# Patient Record
Sex: FEMALE | Race: WHITE | NOT HISPANIC OR LATINO | Employment: FULL TIME | URBAN - METROPOLITAN AREA
[De-identification: names, ages, dates, MRNs, and addresses within clinical notes are randomized per-mention and may not be internally consistent; named-entity substitution may affect disease eponyms.]

---

## 2018-04-04 ENCOUNTER — OFFICE VISIT (OUTPATIENT)
Dept: URGENT CARE | Facility: CLINIC | Age: 43
End: 2018-04-04

## 2018-04-04 VITALS
BODY MASS INDEX: 37.29 KG/M2 | TEMPERATURE: 97.4 F | DIASTOLIC BLOOD PRESSURE: 78 MMHG | SYSTOLIC BLOOD PRESSURE: 116 MMHG | RESPIRATION RATE: 16 BRPM | WEIGHT: 237.6 LBS | HEART RATE: 84 BPM | HEIGHT: 67 IN

## 2018-04-04 DIAGNOSIS — L03.115 CELLULITIS OF RIGHT LOWER EXTREMITY: ICD-10-CM

## 2018-04-04 PROCEDURE — 99204 OFFICE O/P NEW MOD 45 MIN: CPT | Performed by: FAMILY MEDICINE

## 2018-04-04 RX ORDER — SULFAMETHOXAZOLE AND TRIMETHOPRIM 800; 160 MG/1; MG/1
1 TABLET ORAL 2 TIMES DAILY
Qty: 20 TAB | Refills: 0 | Status: SHIPPED | OUTPATIENT
Start: 2018-04-04 | End: 2018-04-14

## 2018-04-04 RX ORDER — SULFAMETHOXAZOLE AND TRIMETHOPRIM 800; 160 MG/1; MG/1
1 TABLET ORAL 2 TIMES DAILY
Qty: 20 TAB | Refills: 0 | Status: SHIPPED | OUTPATIENT
Start: 2018-04-04 | End: 2018-04-04 | Stop reason: SDUPTHER

## 2018-04-04 NOTE — LETTER
April 4, 2018         Patient: Kavitha Benson   YOB: 1975   Date of Visit: 4/4/2018           To Whom it May Concern:    Kavitha Benson was seen in my clinic on 4/4/2018. He may return to work on 4/4/2018 with her wound dressed..    If you have any questions or concerns, please don't hesitate to call.        Sincerely,           Sukhwinder Deluca M.D.  Electronically Signed

## 2018-04-09 ASSESSMENT — ENCOUNTER SYMPTOMS
NUMBNESS: 0
CHILLS: 0
NAUSEA: 0
SHORTNESS OF BREATH: 0
EYE PAIN: 0
FEVER: 0
VOMITING: 0
WEAKNESS: 0
MYALGIAS: 0
SORE THROAT: 0
LEG SWELLING: 1
DIZZINESS: 0

## 2018-04-09 NOTE — PROGRESS NOTES
"  Subjective:     Kavitha Benson is a 42 y.o. male who presents for Abscess (right lower leg abscess X 1 week )       Leg Swelling   This is a new problem. The current episode started in the past 7 days. The problem occurs constantly. The problem has been rapidly worsening. Pertinent negatives include no chest pain, chills, fever, myalgias, nausea, numbness, rash, sore throat, vomiting or weakness.   History reviewed. No pertinent past medical history.History reviewed. No pertinent surgical history.  Social History     Social History   • Marital status: Single     Spouse name: N/A   • Number of children: N/A   • Years of education: N/A     Occupational History   • Not on file.     Social History Main Topics   • Smoking status: Never Smoker   • Smokeless tobacco: Never Used   • Alcohol use No   • Drug use: No   • Sexual activity: Not on file     Other Topics Concern   • Not on file     Social History Narrative   • No narrative on file      Family History   Problem Relation Age of Onset   • Stroke Neg Hx    • Heart Disease Neg Hx     Review of Systems   Constitutional: Negative for chills and fever.   HENT: Negative for sore throat.    Eyes: Negative for pain.   Respiratory: Negative for shortness of breath.    Cardiovascular: Negative for chest pain.   Gastrointestinal: Negative for nausea and vomiting.   Genitourinary: Negative for hematuria.   Musculoskeletal: Negative for myalgias.   Skin: Negative for rash.   Neurological: Negative for dizziness, weakness and numbness.   No Known Allergies   Objective:   /78   Pulse 84   Temp 36.3 °C (97.4 °F)   Resp 16   Ht 1.702 m (5' 7\")   Wt 107.8 kg (237 lb 9.6 oz)   BMI 37.21 kg/m²   Physical Exam   Constitutional: He is oriented to person, place, and time. He appears well-developed and well-nourished. No distress.   HENT:   Head: Normocephalic and atraumatic.   Eyes: Conjunctivae and EOM are normal. Pupils are equal, round, and reactive to light.   "   Cardiovascular: Normal rate and regular rhythm.    No murmur heard.  Pulmonary/Chest: Effort normal and breath sounds normal. No respiratory distress.   Abdominal: Soft. He exhibits no distension. There is no tenderness.   Neurological: He is alert and oriented to person, place, and time. He has normal reflexes. No sensory deficit.   Skin: Skin is warm and dry.        Psychiatric: He has a normal mood and affect.         Assessment/Plan:   Assessment    1. Cellulitis of right lower extremity  - sulfamethoxazole-trimethoprim (BACTRIM DS) 800-160 MG tablet; Take 1 Tab by mouth 2 times a day for 10 days.  Dispense: 20 Tab; Refill: 0  Differential diagnosis, natural history, supportive care, and indications for immediate follow-up discussed.

## 2018-09-02 ENCOUNTER — HOSPITAL ENCOUNTER (INPATIENT)
Facility: MEDICAL CENTER | Age: 43
LOS: 6 days | DRG: 504 | End: 2018-09-08
Attending: EMERGENCY MEDICINE | Admitting: INTERNAL MEDICINE

## 2018-09-02 ENCOUNTER — APPOINTMENT (OUTPATIENT)
Dept: RADIOLOGY | Facility: MEDICAL CENTER | Age: 43
DRG: 504 | End: 2018-09-02
Attending: EMERGENCY MEDICINE

## 2018-09-02 PROBLEM — M86.9 OSTEOMYELITIS (HCC): Status: ACTIVE | Noted: 2018-09-02

## 2018-09-02 PROBLEM — R74.8 ABNORMAL SERUM LEVEL OF ALKALINE PHOSPHATASE: Status: ACTIVE | Noted: 2018-09-02

## 2018-09-02 PROBLEM — F19.10 SUBSTANCE ABUSE (HCC): Status: ACTIVE | Noted: 2018-09-02

## 2018-09-02 PROBLEM — E11.9 DM (DIABETES MELLITUS) (HCC): Status: ACTIVE | Noted: 2018-09-02

## 2018-09-02 PROBLEM — E87.1 HYPONATREMIA: Status: ACTIVE | Noted: 2018-09-02

## 2018-09-02 PROBLEM — D72.829 LEUKOCYTOSIS: Status: ACTIVE | Noted: 2018-09-02

## 2018-09-02 LAB
ALBUMIN SERPL BCP-MCNC: 3.9 G/DL (ref 3.2–4.9)
ALBUMIN/GLOB SERPL: 0.8 G/DL
ALP SERPL-CCNC: 113 U/L (ref 30–99)
ALT SERPL-CCNC: 8 U/L (ref 2–50)
AMPHET UR QL SCN: POSITIVE
ANION GAP SERPL CALC-SCNC: 5 MMOL/L (ref 0–11.9)
APPEARANCE UR: CLEAR
AST SERPL-CCNC: 11 U/L (ref 12–45)
BARBITURATES UR QL SCN: NEGATIVE
BASOPHILS # BLD AUTO: 0.3 % (ref 0–1.8)
BASOPHILS # BLD: 0.04 K/UL (ref 0–0.12)
BENZODIAZ UR QL SCN: NEGATIVE
BILIRUB SERPL-MCNC: 0.4 MG/DL (ref 0.1–1.5)
BILIRUB UR QL STRIP.AUTO: NEGATIVE
BUN SERPL-MCNC: 13 MG/DL (ref 8–22)
BZE UR QL SCN: NEGATIVE
CALCIUM SERPL-MCNC: 9.4 MG/DL (ref 8.5–10.5)
CANNABINOIDS UR QL SCN: NEGATIVE
CHLORIDE SERPL-SCNC: 93 MMOL/L (ref 96–112)
CO2 SERPL-SCNC: 32 MMOL/L (ref 20–33)
COLOR UR: YELLOW
CREAT SERPL-MCNC: 0.64 MG/DL (ref 0.5–1.4)
CREAT UR-MCNC: 61.8 MG/DL
CRP SERPL HS-MCNC: 1.85 MG/DL (ref 0–0.75)
EKG IMPRESSION: NORMAL
EOSINOPHIL # BLD AUTO: 0.24 K/UL (ref 0–0.51)
EOSINOPHIL NFR BLD: 1.9 % (ref 0–6.9)
ERYTHROCYTE [DISTWIDTH] IN BLOOD BY AUTOMATED COUNT: 37.9 FL (ref 35.9–50)
ERYTHROCYTE [SEDIMENTATION RATE] IN BLOOD BY WESTERGREN METHOD: 94 MM/HOUR (ref 0–20)
EST. AVERAGE GLUCOSE BLD GHB EST-MCNC: 332 MG/DL
GLOBULIN SER CALC-MCNC: 4.9 G/DL (ref 1.9–3.5)
GLUCOSE BLD-MCNC: 312 MG/DL (ref 65–99)
GLUCOSE SERPL-MCNC: 431 MG/DL (ref 65–99)
GLUCOSE UR STRIP.AUTO-MCNC: >=1000 MG/DL
GRAM STN SPEC: NORMAL
HBA1C MFR BLD: 13.2 % (ref 0–5.6)
HCT VFR BLD AUTO: 41.4 % (ref 37–47)
HGB BLD-MCNC: 13.6 G/DL (ref 12–16)
IMM GRANULOCYTES # BLD AUTO: 0.04 K/UL (ref 0–0.11)
IMM GRANULOCYTES NFR BLD AUTO: 0.3 % (ref 0–0.9)
KETONES UR STRIP.AUTO-MCNC: NEGATIVE MG/DL
LACTATE BLD-SCNC: 0.7 MMOL/L (ref 0.5–2)
LEUKOCYTE ESTERASE UR QL STRIP.AUTO: NEGATIVE
LYMPHOCYTES # BLD AUTO: 2.58 K/UL (ref 1–4.8)
LYMPHOCYTES NFR BLD: 20.4 % (ref 22–41)
MAGNESIUM SERPL-MCNC: 1.8 MG/DL (ref 1.5–2.5)
MCH RBC QN AUTO: 27.9 PG (ref 27–33)
MCHC RBC AUTO-ENTMCNC: 32.9 G/DL (ref 33.6–35)
MCV RBC AUTO: 85 FL (ref 81.4–97.8)
METHADONE UR QL SCN: NEGATIVE
MICRO URNS: ABNORMAL
MICROALBUMIN UR-MCNC: <0.7 MG/DL
MICROALBUMIN/CREAT UR: NORMAL MG/G (ref 0–30)
MONOCYTES # BLD AUTO: 0.74 K/UL (ref 0–0.85)
MONOCYTES NFR BLD AUTO: 5.9 % (ref 0–13.4)
NEUTROPHILS # BLD AUTO: 9 K/UL (ref 2–7.15)
NEUTROPHILS NFR BLD: 71.2 % (ref 44–72)
NITRITE UR QL STRIP.AUTO: NEGATIVE
NRBC # BLD AUTO: 0 K/UL
NRBC BLD-RTO: 0 /100 WBC
OPIATES UR QL SCN: NEGATIVE
OXYCODONE UR QL SCN: NEGATIVE
PCP UR QL SCN: NEGATIVE
PH UR STRIP.AUTO: 6 [PH]
PLATELET # BLD AUTO: 400 K/UL (ref 164–446)
PMV BLD AUTO: 9.6 FL (ref 9–12.9)
POTASSIUM SERPL-SCNC: 3.9 MMOL/L (ref 3.6–5.5)
PROPOXYPH UR QL SCN: NEGATIVE
PROT SERPL-MCNC: 8.8 G/DL (ref 6–8.2)
PROT UR QL STRIP: NEGATIVE MG/DL
RBC # BLD AUTO: 4.87 M/UL (ref 4.2–5.4)
RBC UR QL AUTO: NEGATIVE
SIGNIFICANT IND 70042: NORMAL
SITE SITE: NORMAL
SODIUM SERPL-SCNC: 130 MMOL/L (ref 135–145)
SOURCE SOURCE: NORMAL
SP GR UR STRIP.AUTO: 1.03
T4 FREE SERPL-MCNC: 0.77 NG/DL (ref 0.53–1.43)
TSH SERPL DL<=0.005 MIU/L-ACNC: 5.56 UIU/ML (ref 0.38–5.33)
UROBILINOGEN UR STRIP.AUTO-MCNC: 1 MG/DL
WBC # BLD AUTO: 12.6 K/UL (ref 4.8–10.8)

## 2018-09-02 PROCEDURE — 96367 TX/PROPH/DG ADDL SEQ IV INF: CPT

## 2018-09-02 PROCEDURE — 87077 CULTURE AEROBIC IDENTIFY: CPT

## 2018-09-02 PROCEDURE — 93010 ELECTROCARDIOGRAM REPORT: CPT | Performed by: INTERNAL MEDICINE

## 2018-09-02 PROCEDURE — 80053 COMPREHEN METABOLIC PANEL: CPT

## 2018-09-02 PROCEDURE — 82043 UR ALBUMIN QUANTITATIVE: CPT

## 2018-09-02 PROCEDURE — 700102 HCHG RX REV CODE 250 W/ 637 OVERRIDE(OP): Performed by: STUDENT IN AN ORGANIZED HEALTH CARE EDUCATION/TRAINING PROGRAM

## 2018-09-02 PROCEDURE — 80307 DRUG TEST PRSMV CHEM ANLYZR: CPT

## 2018-09-02 PROCEDURE — 96365 THER/PROPH/DIAG IV INF INIT: CPT

## 2018-09-02 PROCEDURE — 81003 URINALYSIS AUTO W/O SCOPE: CPT

## 2018-09-02 PROCEDURE — 36415 COLL VENOUS BLD VENIPUNCTURE: CPT

## 2018-09-02 PROCEDURE — 99223 1ST HOSP IP/OBS HIGH 75: CPT | Mod: AI | Performed by: INTERNAL MEDICINE

## 2018-09-02 PROCEDURE — 84439 ASSAY OF FREE THYROXINE: CPT

## 2018-09-02 PROCEDURE — 84443 ASSAY THYROID STIM HORMONE: CPT

## 2018-09-02 PROCEDURE — 770006 HCHG ROOM/CARE - MED/SURG/GYN SEMI*

## 2018-09-02 PROCEDURE — 700105 HCHG RX REV CODE 258: Performed by: EMERGENCY MEDICINE

## 2018-09-02 PROCEDURE — 85025 COMPLETE CBC W/AUTO DIFF WBC: CPT

## 2018-09-02 PROCEDURE — 85652 RBC SED RATE AUTOMATED: CPT

## 2018-09-02 PROCEDURE — 86140 C-REACTIVE PROTEIN: CPT

## 2018-09-02 PROCEDURE — 700111 HCHG RX REV CODE 636 W/ 250 OVERRIDE (IP): Performed by: EMERGENCY MEDICINE

## 2018-09-02 PROCEDURE — 83605 ASSAY OF LACTIC ACID: CPT

## 2018-09-02 PROCEDURE — 87186 SC STD MICRODIL/AGAR DIL: CPT

## 2018-09-02 PROCEDURE — 82570 ASSAY OF URINE CREATININE: CPT

## 2018-09-02 PROCEDURE — 87040 BLOOD CULTURE FOR BACTERIA: CPT | Mod: 91

## 2018-09-02 PROCEDURE — 87070 CULTURE OTHR SPECIMN AEROBIC: CPT

## 2018-09-02 PROCEDURE — 93005 ELECTROCARDIOGRAM TRACING: CPT | Performed by: STUDENT IN AN ORGANIZED HEALTH CARE EDUCATION/TRAINING PROGRAM

## 2018-09-02 PROCEDURE — 73630 X-RAY EXAM OF FOOT: CPT | Mod: RT

## 2018-09-02 PROCEDURE — 700105 HCHG RX REV CODE 258: Performed by: INTERNAL MEDICINE

## 2018-09-02 PROCEDURE — 700111 HCHG RX REV CODE 636 W/ 250 OVERRIDE (IP): Performed by: STUDENT IN AN ORGANIZED HEALTH CARE EDUCATION/TRAINING PROGRAM

## 2018-09-02 PROCEDURE — 83036 HEMOGLOBIN GLYCOSYLATED A1C: CPT

## 2018-09-02 PROCEDURE — 83735 ASSAY OF MAGNESIUM: CPT

## 2018-09-02 PROCEDURE — 700111 HCHG RX REV CODE 636 W/ 250 OVERRIDE (IP): Performed by: INTERNAL MEDICINE

## 2018-09-02 PROCEDURE — 700105 HCHG RX REV CODE 258: Performed by: STUDENT IN AN ORGANIZED HEALTH CARE EDUCATION/TRAINING PROGRAM

## 2018-09-02 PROCEDURE — 82962 GLUCOSE BLOOD TEST: CPT

## 2018-09-02 PROCEDURE — 87205 SMEAR GRAM STAIN: CPT

## 2018-09-02 PROCEDURE — 99285 EMERGENCY DEPT VISIT HI MDM: CPT

## 2018-09-02 RX ORDER — MAGNESIUM SULFATE HEPTAHYDRATE 40 MG/ML
2 INJECTION, SOLUTION INTRAVENOUS ONCE
Status: COMPLETED | OUTPATIENT
Start: 2018-09-02 | End: 2018-09-02

## 2018-09-02 RX ORDER — POLYETHYLENE GLYCOL 3350 17 G/17G
1 POWDER, FOR SOLUTION ORAL
Status: DISCONTINUED | OUTPATIENT
Start: 2018-09-02 | End: 2018-09-08 | Stop reason: HOSPADM

## 2018-09-02 RX ORDER — SODIUM CHLORIDE 9 MG/ML
1000 INJECTION, SOLUTION INTRAVENOUS ONCE
Status: COMPLETED | OUTPATIENT
Start: 2018-09-02 | End: 2018-09-02

## 2018-09-02 RX ORDER — ACETAMINOPHEN 325 MG/1
650 TABLET ORAL EVERY 6 HOURS PRN
Status: DISCONTINUED | OUTPATIENT
Start: 2018-09-02 | End: 2018-09-08 | Stop reason: HOSPADM

## 2018-09-02 RX ORDER — SODIUM CHLORIDE 9 MG/ML
INJECTION, SOLUTION INTRAVENOUS CONTINUOUS
Status: DISCONTINUED | OUTPATIENT
Start: 2018-09-02 | End: 2018-09-05

## 2018-09-02 RX ORDER — LABETALOL HYDROCHLORIDE 5 MG/ML
10 INJECTION, SOLUTION INTRAVENOUS EVERY 4 HOURS PRN
Status: DISCONTINUED | OUTPATIENT
Start: 2018-09-02 | End: 2018-09-08 | Stop reason: HOSPADM

## 2018-09-02 RX ORDER — AMOXICILLIN 250 MG
2 CAPSULE ORAL 2 TIMES DAILY
Status: DISCONTINUED | OUTPATIENT
Start: 2018-09-02 | End: 2018-09-08 | Stop reason: HOSPADM

## 2018-09-02 RX ORDER — BISACODYL 10 MG
10 SUPPOSITORY, RECTAL RECTAL
Status: DISCONTINUED | OUTPATIENT
Start: 2018-09-02 | End: 2018-09-08 | Stop reason: HOSPADM

## 2018-09-02 RX ORDER — DEXTROSE MONOHYDRATE 25 G/50ML
25 INJECTION, SOLUTION INTRAVENOUS
Status: DISCONTINUED | OUTPATIENT
Start: 2018-09-02 | End: 2018-09-05

## 2018-09-02 RX ADMIN — SODIUM CHLORIDE: 9 INJECTION, SOLUTION INTRAVENOUS at 12:41

## 2018-09-02 RX ADMIN — AMPICILLIN SODIUM AND SULBACTAM SODIUM 3 G: 2; 1 INJECTION, POWDER, FOR SOLUTION INTRAMUSCULAR; INTRAVENOUS at 13:29

## 2018-09-02 RX ADMIN — VANCOMYCIN HYDROCHLORIDE 1700 MG: 100 INJECTION, POWDER, LYOPHILIZED, FOR SOLUTION INTRAVENOUS at 22:30

## 2018-09-02 RX ADMIN — ENOXAPARIN SODIUM 40 MG: 100 INJECTION SUBCUTANEOUS at 12:40

## 2018-09-02 RX ADMIN — AMPICILLIN SODIUM AND SULBACTAM SODIUM 3 G: 2; 1 INJECTION, POWDER, FOR SOLUTION INTRAMUSCULAR; INTRAVENOUS at 18:06

## 2018-09-02 RX ADMIN — AMPICILLIN AND SULBACTAM 3 G: 2; 1 INJECTION, POWDER, FOR SOLUTION INTRAVENOUS at 09:52

## 2018-09-02 RX ADMIN — INSULIN HUMAN 4 UNITS: 100 INJECTION, SOLUTION PARENTERAL at 16:05

## 2018-09-02 RX ADMIN — MAGNESIUM SULFATE IN WATER 2 G: 40 INJECTION, SOLUTION INTRAVENOUS at 14:18

## 2018-09-02 RX ADMIN — SODIUM CHLORIDE 1000 ML: 9 INJECTION, SOLUTION INTRAVENOUS at 09:30

## 2018-09-02 RX ADMIN — VANCOMYCIN HYDROCHLORIDE 2500 MG: 100 INJECTION, POWDER, LYOPHILIZED, FOR SOLUTION INTRAVENOUS at 10:39

## 2018-09-02 ASSESSMENT — LIFESTYLE VARIABLES
EVER_SMOKED: NEVER
DO YOU DRINK ALCOHOL: NO
SUBSTANCE_ABUSE: 1

## 2018-09-02 ASSESSMENT — ENCOUNTER SYMPTOMS
CLAUDICATION: 0
HEARTBURN: 0
NERVOUS/ANXIOUS: 1
MYALGIAS: 0
BLURRED VISION: 0
TINGLING: 0
WHEEZING: 0
SORE THROAT: 0
HEADACHES: 0
EYE PAIN: 0
PHOTOPHOBIA: 0
CHILLS: 0
VOMITING: 0
SPUTUM PRODUCTION: 0
EYE REDNESS: 0
DOUBLE VISION: 0
BRUISES/BLEEDS EASILY: 0
DIAPHORESIS: 0
ORTHOPNEA: 0
BACK PAIN: 0
CONSTIPATION: 0
SENSORY CHANGE: 0
HEMOPTYSIS: 0
EYE DISCHARGE: 0
COUGH: 0
POLYDIPSIA: 0
WEIGHT LOSS: 0
SINUS PAIN: 0
WEAKNESS: 0
DIZZINESS: 0
BLOOD IN STOOL: 0
NAUSEA: 0
DIARRHEA: 0
ABDOMINAL PAIN: 0
SHORTNESS OF BREATH: 0
PALPITATIONS: 0
FLANK PAIN: 0
FEVER: 0
NECK PAIN: 0

## 2018-09-02 ASSESSMENT — PAIN SCALES - GENERAL
PAINLEVEL_OUTOF10: 0
PAINLEVEL_OUTOF10: 0
PAINLEVEL_OUTOF10: 1
PAINLEVEL_OUTOF10: 0

## 2018-09-02 ASSESSMENT — COPD QUESTIONNAIRES
HAVE YOU SMOKED AT LEAST 100 CIGARETTES IN YOUR ENTIRE LIFE: NO/DON'T KNOW
DURING THE PAST 4 WEEKS HOW MUCH DID YOU FEEL SHORT OF BREATH: NONE/LITTLE OF THE TIME
COPD SCREENING SCORE: 0
IN THE PAST 12 MONTHS DO YOU DO LESS THAN YOU USED TO BECAUSE OF YOUR BREATHING PROBLEMS: DISAGREE/UNSURE
DO YOU EVER COUGH UP ANY MUCUS OR PHLEGM?: NO/ONLY WITH OCCASIONAL COLDS OR INFECTIONS

## 2018-09-02 ASSESSMENT — COGNITIVE AND FUNCTIONAL STATUS - GENERAL
SUGGESTED CMS G CODE MODIFIER MOBILITY: CI
DAILY ACTIVITIY SCORE: 24
MOBILITY SCORE: 23
SUGGESTED CMS G CODE MODIFIER DAILY ACTIVITY: CH
CLIMB 3 TO 5 STEPS WITH RAILING: A LITTLE

## 2018-09-02 ASSESSMENT — PATIENT HEALTH QUESTIONNAIRE - PHQ9
2. FEELING DOWN, DEPRESSED, IRRITABLE, OR HOPELESS: NOT AT ALL
SUM OF ALL RESPONSES TO PHQ9 QUESTIONS 1 AND 2: 0
1. LITTLE INTEREST OR PLEASURE IN DOING THINGS: NOT AT ALL

## 2018-09-02 NOTE — ED PROVIDER NOTES
"ED Provider Note  CHIEF COMPLAINT  Chief Complaint   Patient presents with   • Wound Infection     right 2nd toe        HPI  Kavitha Benson is a 43 y.o. male who presents for evaluation of significant swelling redness to her right 2nd toe. The patient has no stated medical or surgical history. She reports that she had similar swelling to another told him infected and that simply better with oral antibiotics but she reports that this is much worse. She did attempt to cut her toenails about a week or 2 ago. Swelling got to the point that she cannot wear shoe. She denies history of diabetes. No direct blunt draining trauma. No other complaints    REVIEW OF SYSTEMS  See HPI for further details. No reported fevers  cyanosis apnea All other systems are negative.     PAST MEDICAL HISTORY  No past medical history on file.  No stated medical history  FAMILY HISTORY  Noncontributory    SOCIAL HISTORY  Social History     Social History   • Marital status: Single     Spouse name: N/A   • Number of children: N/A   • Years of education: N/A     Social History Main Topics   • Smoking status: Never Smoker   • Smokeless tobacco: Never Used   • Alcohol use No   • Drug use: Yes      Comment: \"smokes speed\"   • Sexual activity: Not on file     Other Topics Concern   • Not on file     Social History Narrative   • No narrative on file   No IV drugs    SURGICAL HISTORY  No past surgical history on file.  No major surgeries  CURRENT MEDICATIONS  Home Medications     Reviewed by Kasey Mosley R.N. (Registered Nurse) on 09/02/18 at 0742  Med List Status: Not Addressed   Medication Last Dose Status        Patient Pritesh Taking any Medications                       ALLERGIES  No Known Allergies    PHYSICAL EXAM  VITAL SIGNS: /80   Pulse 94   Temp 35.9 °C (96.7 °F)   Resp 16   Ht 1.702 m (5' 7\")   Wt 101.9 kg (224 lb 10.4 oz)   SpO2 98%   BMI 35.18 kg/m²  Room air O2: 98    Constitutional: Well developed, Well nourished, " No acute distress, Non-toxic appearance.   HENT: Normocephalic, Atraumatic, Bilateral external ears normal, Oropharynx moist, No oral exudates, Nose normal.   Eyes: PERRLA, EOMI, Conjunctiva normal, No discharge.   Neck: Normal range of motion, No tenderness, Supple, No stridor.   Lymphatic: No lymphadenopathy noted.   Cardiovascular: Normal heart rate, Normal rhythm, No murmurs, No rubs, No gallops.   Thorax & Lungs: Normal breath sounds, No respiratory distress, No wheezing, No chest tenderness.   Abdomen: Bowel sounds normal, Soft, No tenderness, No masses, No pulsatile masses.   Skin: Warm, Dry, No erythema, No rash.   Back: No tenderness, No CVA tenderness.   Extremities right foot exam is notable for extensive varicosities. The right 2nd toe is profoundly edematous erythematous swollen no necrosis. There is about 4 times the normal size   Neurologic: Alert & oriented x 3, Normal motor function, Normal sensory function, No focal deficits noted.   Psychiatric anxious    Results for orders placed or performed during the hospital encounter of 09/02/18   CBC WITH DIFFERENTIAL   Result Value Ref Range    WBC 12.6 (H) 4.8 - 10.8 K/uL    RBC 4.87 4.20 - 5.40 M/uL    Hemoglobin 13.6 12.0 - 16.0 g/dL    Hematocrit 41.4 37.0 - 47.0 %    MCV 85.0 81.4 - 97.8 fL    MCH 27.9 27.0 - 33.0 pg    MCHC 32.9 (L) 33.6 - 35.0 g/dL    RDW 37.9 35.9 - 50.0 fL    Platelet Count 400 164 - 446 K/uL    MPV 9.6 9.0 - 12.9 fL    Neutrophils-Polys 71.20 44.00 - 72.00 %    Lymphocytes 20.40 (L) 22.00 - 41.00 %    Monocytes 5.90 0.00 - 13.40 %    Eosinophils 1.90 0.00 - 6.90 %    Basophils 0.30 0.00 - 1.80 %    Immature Granulocytes 0.30 0.00 - 0.90 %    Nucleated RBC 0.00 /100 WBC    Neutrophils (Absolute) 9.00 (H) 2.00 - 7.15 K/uL    Lymphs (Absolute) 2.58 1.00 - 4.80 K/uL    Monos (Absolute) 0.74 0.00 - 0.85 K/uL    Eos (Absolute) 0.24 0.00 - 0.51 K/uL    Baso (Absolute) 0.04 0.00 - 0.12 K/uL    Immature Granulocytes (abs) 0.04 0.00 - 0.11  K/uL    NRBC (Absolute) 0.00 K/uL   COMP METABOLIC PANEL   Result Value Ref Range    Sodium 130 (L) 135 - 145 mmol/L    Potassium 3.9 3.6 - 5.5 mmol/L    Chloride 93 (L) 96 - 112 mmol/L    Co2 32 20 - 33 mmol/L    Anion Gap 5.0 0.0 - 11.9    Glucose 431 (H) 65 - 99 mg/dL    Bun 13 8 - 22 mg/dL    Creatinine 0.64 0.50 - 1.40 mg/dL    Calcium 9.4 8.5 - 10.5 mg/dL    AST(SGOT) 11 (L) 12 - 45 U/L    ALT(SGPT) 8 2 - 50 U/L    Alkaline Phosphatase 113 (H) 30 - 99 U/L    Total Bilirubin 0.4 0.1 - 1.5 mg/dL    Albumin 3.9 3.2 - 4.9 g/dL    Total Protein 8.8 (H) 6.0 - 8.2 g/dL    Globulin 4.9 (H) 1.9 - 3.5 g/dL    A-G Ratio 0.8 g/dL   CRP QUANTITIVE (NON-CARDIAC)   Result Value Ref Range    Stat C-Reactive Protein 1.85 (H) 0.00 - 0.75 mg/dL   ESTIMATED GFR   Result Value Ref Range    GFR If African American >60 >60 mL/min/1.73 m 2    GFR If Non African American >60 >60 mL/min/1.73 m 2        RADIOLOGY/PROCEDURES  DX-FOOT-COMPLETE 3+ RIGHT   Final Result      Extensive soft tissue swelling with extensive bone destruction involving the second middle and distal phalanges suggesting osteomyelitis with a destructive neoplastic process seeming less likely.      Similar findings are also noted to a lesser degree involving the third and fourth phalangeal marilin.            COURSE & MEDICAL DECISION MAKING  Pertinent Labs & Imaging studies reviewed. (See chart for details)  Blood cultures have been performed. The patient has suggestion here of significant diabetic toe infection, cellulitis and likely osteomyelitis. She is a newly diagnosed diabetic and with her extensive infection I do believe that she will require admission to the hospital. She was given vancomycin and Unasyn and will be admitted to [facility]. The patient was clinically dehydrated due to hyperglycemia and was given an IV fluid bolus after IV fluid administration she was feeling better    FINAL IMPRESSION  1. Right 2nd toe cellulitis with  osteomyelitis  2. New diagnosis of diabetes mellitus    Admission         Electronically signed by: Cordell Colindres, 9/2/2018 8:06 AM

## 2018-09-02 NOTE — NON-PROVIDER
Internal Medicine Medical Student Admitting History and Physical  Note Author: Jacob Roque, Student    Name Kavitha Benson     1975   Age/Sex 43 y.o. female   MRN [id number]   Code Status FULL       Chief Complaint:  Severely swollen second digit of right foot      HPI:    The patient is a 43 year old female with no significant known past medical history presenting to the emergency department for a severely inflamed and most likely infected second digit of her right foot. The patient reports that her toe has been inflamed for 3 to 4 weeks but it has not swollen up this much before. She also reports that she had a similar episode of toe swelling back in April of this year but thinks this time may have been caused by trauma to the skin during toenail clipping. She says that she has had to change the type of shoes that she wears to accommodate how large her toe has become. She denies any pain in her toe and she also denies fatigue, fever, chills, night sweats, nausea, vomiting, or myalgias.     She is visibly upset/agitated and states that she is upset because she did not plan to be here and now she cannot get her finances like rent and payday loans together while she is in the hospital. When she was asked about her blood sugar and the idea that she is likely diabetic, she denies any past knowledge of her having the condition. She admits that she has not seen a primary care provider in a very long time.        Review of Systems   Constitutional: Negative for chills, diaphoresis, fever, malaise/fatigue and weight loss.   HENT: Negative for ear pain, hearing loss and sore throat.    Eyes: Negative for blurred vision and pain.   Respiratory: Negative for cough and shortness of breath.    Cardiovascular: Positive for leg swelling. Negative for chest pain.   Gastrointestinal: Negative for abdominal pain, constipation, diarrhea, nausea and vomiting.   Genitourinary: Negative for dysuria, flank pain,  "frequency and urgency.   Musculoskeletal: Negative for myalgias.   Skin: Positive for itching. Negative for rash.   Neurological: Negative for tingling, weakness and headaches.   Psychiatric/Behavioral: Positive for substance abuse. The patient is nervous/anxious.              Past Medical History (chronic problems, known complications, current management)     The patient denies knowledge of any past medical history.    Past Surgical History:  The patient denies any past history of surgeries.      Medication Allergy/Sensitivities:  The patient denies any medication allergies.      Family History:  Patient reports that her father and sister have diabetes mellitus.    Social History:  The patient denies smoking tobacco. She reports occasional alcohol use but when asked about amounts or frequency, she responds by saying \"I don't know.\" She reports smoking marijuana and smoking/snorting/injecting methamphetamine but when asked about amounts or frequency, she responds by saying \"I don't know.\" She reports that her last use of methamphetamine was on the day of her hospital admission (9/2/18). She denies being sexually active. She reports living alone at home with her dog, she reports working at a [employer].      Physical Exam     Vitals:    09/02/18 0656 09/02/18 0702 09/02/18 1031 09/02/18 1200   BP: 122/80  127/82 118/68   Pulse: 94  92 72   Resp: 16  16 16   Temp: 35.9 °C (96.7 °F)   36.6 °C (97.9 °F)   SpO2: 98%  98% 98%   Weight:  101.9 kg (224 lb 10.4 oz)     Height: 1.702 m (5' 7\")        Body mass index is 35.18 kg/m².  /68   Pulse 72   Temp 36.6 °C (97.9 °F)   Resp 16   Ht 1.702 m (5' 7\")   Wt 101.9 kg (224 lb 10.4 oz)   SpO2 98%   Breastfeeding? No   BMI 35.18 kg/m²   O2 therapy: Pulse Oximetry: 98 %, O2 Delivery: None (Room Air)    Physical Exam   Constitutional: She is oriented to person, place, and time. She appears distressed.   Patient is well developed and nourished, but she is visibly upset " and anxious about her responsibilities outside of the hospital.   HENT:   Head: Normocephalic and atraumatic.   Mouth/Throat: Oropharynx is clear and moist. No oropharyngeal exudate.   The patient has poor dentition.   Eyes: Pupils are equal, round, and reactive to light. Conjunctivae and EOM are normal. Right eye exhibits no discharge. Left eye exhibits no discharge. No scleral icterus.   Neck: Normal range of motion. Neck supple.   Cardiovascular: Normal rate, regular rhythm, S1 normal, S2 normal and intact distal pulses.  Exam reveals gallop and S3. Exam reveals no distant heart sounds and no friction rub.    Pulmonary/Chest: Effort normal and breath sounds normal. No respiratory distress. She has no wheezes. She has no rales. She exhibits no tenderness.   Abdominal: Soft. Bowel sounds are normal. She exhibits no distension. There is no tenderness. There is no rebound and no guarding.   Musculoskeletal: Normal range of motion. She exhibits edema.   The patient exhibits 1+ edema in her right lower leg and has decreased range of motion of her second digit of her right foot. This digit is also severely inflamed and occasionally leaks purulent material.   Neurological: She is alert and oriented to person, place, and time. No cranial nerve deficit.   Skin: Skin is warm and dry. No rash noted. There is erythema. No pallor.   The patient has multiple excoriated skin lesions on her back, arms, and legs secondary to chronic and consistent scratching. Her second digit on her right foot is erythematous and edematous.   Psychiatric: Memory and judgment normal. Her mood appears anxious. She is agitated.     Lab Results   Component Value Date/Time    WBC 12.6 (H) 09/02/2018 08:21 AM    RBC 4.87 09/02/2018 08:21 AM    HEMOGLOBIN 13.6 09/02/2018 08:21 AM    HEMATOCRIT 41.4 09/02/2018 08:21 AM    MCV 85.0 09/02/2018 08:21 AM    MCH 27.9 09/02/2018 08:21 AM    MCHC 32.9 (L) 09/02/2018 08:21 AM    MPV 9.6 09/02/2018 08:21 AM     NEUTSPOLYS 71.20 09/02/2018 08:21 AM    LYMPHOCYTES 20.40 (L) 09/02/2018 08:21 AM    MONOCYTES 5.90 09/02/2018 08:21 AM    EOSINOPHILS 1.90 09/02/2018 08:21 AM    BASOPHILS 0.30 09/02/2018 08:21 AM     Lab Results   Component Value Date/Time    SODIUM 130 (L) 09/02/2018 08:21 AM    POTASSIUM 3.9 09/02/2018 08:21 AM    CHLORIDE 93 (L) 09/02/2018 08:21 AM    CO2 32 09/02/2018 08:21 AM    GLUCOSE 431 (H) 09/02/2018 08:21 AM    BUN 13 09/02/2018 08:21 AM    CREATININE 0.64 09/02/2018 08:21 AM                                 Results for orders placed during the hospital encounter of 09/02/18   DX-FOOT-COMPLETE 3+ RIGHT    Impression Extensive soft tissue swelling with extensive bone destruction involving the second middle and distal phalanges suggesting osteomyelitis with a destructive neoplastic process seeming less likely.    Similar findings are also noted to a lesser degree involving the third and fourth phalangeal marilin.                                                                      Assessment/Plan     The patient is a 43 year old female with no significant known past medical history presenting to the emergency department for a severely inflamed and most likely infected second digit of her right foot.     #Foot Wound (Osteomyelitis)  The patient's erythematous and edematous second digit of her right toe is likely a result of a bacterial infection causing inflammation and purulent fluid collection. This is evidenced by her elevated white blood cell count, c-reactive protein, and sed rate this morning. She does not meet the criteria for sepsis. The patient has not reported any pain in her infected toe , but she has been prescribed Tylenol 650 mg PO PRN for her pain. The patient had an x-ray performed today which shows results consistent with osteomyelitis. She also had an elevated alkaline phosphatase level this morning which is likely due to infection of her bone (osteomyelysis) and not her biliary tree. The  patient has been informed that she may have to have her infected digit amputated to prevent severe, disseminated infection. However, for now she will be placed on IV Vancomycin and Unasyn (3 g in  ml) to provide empiric treatment for her infection. Wound and blood cultures were drawn today to determine the bacterial agent(s) causing her infection. Because of the presence of cardiac abnormalities while auscultating the chest, the patient should receive an echocardiogram if the blood cultures display bacteremia.     #Diabetes  The patient has displayed new onset diabetes. She has a hemoglobin A1c of 13.2 and her blood sugar was 431 on admission. She denies signs of diabetic neuropathy, but she will have her urine microalbumin to creatinine ratio measured to determine whether or not she may be experiencing diabetic nephropathy. She will also have a urinalysis performed as another diagnostic tool to rule in/out diabetic nephropathy. She needs to be seen by diabetes education and should see a nutritionist about beginning a diabetic diet. Lastly, she will be started on a sliding scale insulin to help with gylcemic control (insulin regular injection 1-6 units 4 times per day). Basal insulin can be started tomorrow and dose adjusted based on the patient's reaction to the sliding scale insulin. In addition, since the patient has a blood pressure less than 130/80 and would be considered high risk due to her lab values and clinical presentation, she is a good candidate for ace inhibitor therapy for renal protection.    #Hyponatremia  The patient had a sodium concentration of 130 mmol/L this morning, but when corrected for hyperglycemia,her sodium concentration was actually 135 mmol/L (normal range). If sodium levels stay within normal limits, then the patient will not have a need for a continuous normal saline infusion.    #Substance abuse  The patient reports methamphetamine use utilizing several different routes of  administration including intravenous. The patient should receive a urine drug screen, an HIV screen, and a hepatitis screen. The patient should be counseled on her drug use because use of methamphetamine could put the patient at high risk for cardiovascular abnormalities, especially with her new onset diabetes.

## 2018-09-02 NOTE — SENIOR ADMIT NOTE
" Senior Admission Note    In summary: Kavitha Benson is a 43 y.o. female with no significant past medical history presented to hospital with complaints of worsening swelling of 2nd toe on right foot. She is a bit vague regarding onset however she states it has been going on for weeks and she has been \"messing with it\". She denies associated history of fever, chills, malaise. Denies trauma to her foot however does acknowledge she was trying to clip her toe nails. She states she finally came in for medical attention since the swelling was not going down. She has been wearing two different shoes due to the swelling. Denies associated pain, numbness or tingling in her foot.     H/o similar infection (abscess in April 2018). She was incidentally found to have blood sugars of 431. This is a new diagnosis for her. Patient states she hasn't seen a doctor in years for annual check ups.      H/o Drug use: used meth this morning. Denies smoking cigarettes and alcohol    Review of Systems:  Constitutional: Denies fevers, chills, malaise  Eyes: Denies acute changes in vision.   Ears/Nose/Throat/Mouth: Denies nasal congestion or sore throat. Reports she has trouble swallowing pills but denies dysphagia to solids/liquids or odynophagia.   Cardiovascular: Denies chest pain or palpitations   Respiratory: Denies shortness of breath , Denies cough  Gastrointestinal/Hepatic: Denies abdominal pain, nausea, vomiting, or diarrhea   Genitourinary: Denies polyuria, dysuria or frequency  Skin/Breast: Denies rash; reports multiple scabs on her lower extremities and back from scratching.    Neurological: Denies headache. Denied focal weakness/parasthesias    Family history of DM in father and her paternal aunt.     In the ED, patient received 1 L IV bolus. Blood cultures and wound cultures obtained and she was empirically started on Unasyn and Vancomycin.         Pertinent physical exam findings:    Vitals:    09/02/18 0656 09/02/18 0702 " "09/02/18 1031 09/02/18 1200   BP: 122/80  127/82 118/68   Pulse: 94  92 72   Resp: 16  16 16   Temp: 35.9 °C (96.7 °F)   36.6 °C (97.9 °F)   SpO2: 98%  98% 98%   Weight:  101.9 kg (224 lb 10.4 oz)     Height: 1.702 m (5' 7\")        General: Not in acute distress. Does not appear ill  HEENT: Normocephalic, atraumatic. Conjunctiva normal. Poor dentition.   Cardiovascular: Normal heart rate, Normal rhythm. Systolic murmur in right sternal border   Lungs: Respiratory effort is normal. Normal breath sounds  Abdomen: Bowel sounds normal, Soft, No tenderness  Skin: No erythema, No rash  Lower limbs: normal, no pitting edema   Neurologic: Alert & oriented x 3, Normal motor function, Normal sensory function, No focal deficits noted, cranial nerves II through XII are normal  PSY: stable mood.   Other systems also examined, grossly normal.     Pertinent studies:   Leukocytosis: WBC 12.6  Hb 13.6  Platelet count 400  ESR: 94 (elevated), CRP (1.85)  Glucose 431  Sodium: 130; corrected was 135  AST 11, ALT 8. T. Bili 0.4 ; Alk phos 113 (elevated; likely from increased bone turnover from osteomyelitis in her toe)  Magnesium 1.8 (will replace with 2 grams)  HbA1c: 13.2  TSH 5.560 (mildly elevated); Free T4 pending however she is asymptomatic.   X-ray foot: diffuse soft tissue swelling involving 2nd digit with extensive bone destruction in middle and distal phalanx likely suggestive of osteomyelitis.     No anion gap  Lactic acid ordered  Blood cultures in process  Wound culture in process  U/A and urine tox in process    Assessment and plan in summary:  1. Osteomyelitis of 2nd toe in right foot  - Confirmed by x-ray findings, elevated inflammatory markers. She has associated leukocytosis however vitals are stable; no hypotension or fevers. No features of sepsis at this time. She did receive 1 L bolus in the ED however sepsis protocol bolus not indicated at this time as no evidence of sepsis. Renal function is preserved. Osteo is " complicated by new onset DM. She does have a murmur on exam; in the setting of IV drug will monitor for possible IE however unlikely at this point as she is clinically stable with no other physical exam evidence of IE.   - Continue empiric antibiotics with Unasyn and Vancomycin. Will de-escalate once cultures return  - Follow-up with cultures (wound and bacterial)  - Continue IV fluids; if she has good oral intake will d/c  - Limb preservation consult placed; will be assessing patient   - Consider echo depending on blood cultures, developments fevers despite optimal antibiotic treatment.   - Tylenol for pain however no significant pain currently      2. Hyponatremia  - 130 however when corrected for hyperglycemia sodium is 135, which is within normal limits  - Will continue some maintenance fluids; if she has good oral intake will discontinue.       3. Diabetes Mellitus  - New diagnosis for patient; she has not been following with physicians regularly.   - HbA1c 13.2.   - Diabetic diet  - Will have diabetic education see patient  - Will start sliding scale for now (insulin naive); will see how she does and add basal insulin in AM  - Will get lipid panel in AM      4. Drug Use  - History of IV drug use. Used as recent as this morning. She apparently shot up about 1 week ago  - Will monitor for possible IE in the setting of infection and systolic heart murmur however unlikely at this point as she does not appear Ill  - Utox pending  - monitor for evidence of meth withdrawal (agitation, hypertension)          For full plan, please see Intern note for details   Lilibeth Cannon M.D.  PGY 2

## 2018-09-02 NOTE — PROGRESS NOTES
Pt has arrived to the floor from the ER. VVS, IV in LAC running Vancomycin. PT is A&Ox4. Pt was settled to her room. Pt has open wounds on her R. 2nd toe that is swollen and red. Wound protocol followed.  2 RN skin check with Pey[place]. She has bilateral scabs on her lower extremities, scabs on her back, an scratch on her left buttock.

## 2018-09-02 NOTE — CARE PLAN
Problem: Infection  Goal: Will remain free from infection  Outcome: PROGRESSING AS EXPECTED  Pt was informed of using IS, brushing teeth, and wound consult, and pt is receiving vancomycin.     Problem: Knowledge Deficit  Goal: Knowledge of disease process/condition, treatment plan, diagnostic tests, and medications will improve  Outcome: PROGRESSING AS EXPECTED  Plan of care discussed with pt, in regards to procedures such as EKG, consults with different specialities such as wound, diabetes, and limb preservation.

## 2018-09-02 NOTE — WOUND TEAM
Pt was seen and is being followed by LPS. Please consult for other wounds if necessary. Wound team not following @ this time.

## 2018-09-02 NOTE — PROGRESS NOTES
LIMB PRESERVATION SERVICE INITIAL CONSULT    REASON FOR LPS CONSULTATION: Right Foot - 2nd Digit Edema\Erythema    History of Present Illness:     Kavitha Benson is a 43 y.o. female admitted 9/2/2018, with a past medical history that includes uncontrolled type 2 diabetes  admitted for Diabetes ([facility])  Cellulitis and abscess of toe of right foot for which LPS has been consulted for. This wound is chronic per the pt but has gotten worse in the past three to four weeks. The patient is a poor historian though and is vague on how long the initial onset of the toe infection was. The pt states the have had multiple toe infections of the right 2nd and 3rd digits for which they have received oral antibiotics for in the past and the wounds resolved. The pt also states the broke their right 4th toe but never received medical attention. They treat their wounds with epsom salts baths but the wounds have failed to improve.   IV antibiotics were started on this admission.  Infectious diseases has been consulted. Dr Loya will be contacted    Xray has been done  MRI has not been done  Ortho will be seen Tuesday - Dr Stevens  Pt states they did not know they were diabetec.  Pt does not check blood sugars.  They have not had previous diabetes education.  They do have numbness in their feet.  They have no diabetic footwear. They do not check their feet routinely. They work as a .     Smoking:   reports that she has never smoked. She has never used smokeless tobacco.    Alcohol:   reports that she does not drink alcohol.    PAST MEDICAL HISTORY:   History reviewed. No pertinent past medical history.    MEDICATIONS:   Current Facility-Administered Medications   Medication Dose   • senna-docusate (PERICOLACE or SENOKOT S) 8.6-50 MG per tablet 2 Tab  2 Tab    And   • polyethylene glycol/lytes (MIRALAX) PACKET 1 Packet  1 Packet    And   • magnesium hydroxide (MILK OF MAGNESIA) suspension 30 mL  30 mL    And   •  "bisacodyl (DULCOLAX) suppository 10 mg  10 mg   • NS infusion     • enoxaparin (LOVENOX) inj 40 mg  40 mg   • labetalol (NORMODYNE,TRANDATE) injection 10 mg  10 mg   • acetaminophen (TYLENOL) tablet 650 mg  650 mg   • insulin regular (HUMULIN R) injection 1-6 Units  1-6 Units    And   • glucose 4 g chewable tablet 16 g  16 g    And   • dextrose 50% (D50W) injection 25 mL  25 mL   • ampicillin/sulbactam (UNASYN) 3 g in  mL IVPB  3 g   • MD Alert...Vancomycin per Pharmacy     • magnesium sulfate IVPB premix 2 g  2 g       ALLERGIES:  No Known Allergies     PAST SURGICAL HISTORY: History reviewed. No pertinent surgical history.    SOCIAL HISTORY:   Social History     Social History   • Marital status: Single     Spouse name: N/A   • Number of children: N/A   • Years of education: N/A     Social History Main Topics   • Smoking status: Never Smoker   • Smokeless tobacco: Never Used   • Alcohol use No   • Drug use: Yes      Comment: \"smokes speed\"   • Sexual activity: Not on file     Other Topics Concern   • Not on file     Social History Narrative   • No narrative on file        FAMILY HISTORY:   Family History   Problem Relation Age of Onset   • Stroke Neg Hx    • Heart Disease Neg Hx        REVIEW OF SYSTEMS:   Constitutional: Negative for chills, fever   Respiratory: Negative for cough and shortness of breath.    Cardiovascular:Negative for chest pain, swelling in legs, and claudication.   Gastrointestinal: Negative for constipation, diarrhea, nausea and vomiting.   Genitourinary: Negative for dysuria.   Neurological: numbness in feet and lower legs    DIAGNOSTIC DATA:   A1C:  Lab Results   Component Value Date/Time    HBA1C 13.2 (H) 09/02/2018 08:21 AM        LAST Blood glucose:   431    WBC   Date/Time Value Ref Range Status   09/02/2018 08:21 AM 12.6 (H) 4.8 - 10.8 K/uL Final     Recent Labs      09/02/18   0821   WBC  12.6*   RBC  4.87   HEMOGLOBIN  13.6   HEMATOCRIT  41.4   MCV  85.0   MCH  27.9   MCHC  " "32.9*   RDW  37.9   PLATELETCT  400   MPV  9.6     Recent Labs      09/02/18 0821   SODIUM  130*   POTASSIUM  3.9   CHLORIDE  93*   CO2  32   GLUCOSE  431*   BUN  13       ESR:   94    CRP:      1.85    X-ray:  9/22/18    DX-FOOT-COMPLETE 3+ RIGHT   Final Result      Extensive soft tissue swelling with extensive bone destruction involving the second middle and distal phalanges suggesting osteomyelitis with a destructive neoplastic process seeming less likely.      Similar findings are also noted to a lesser degree involving the third and fourth phalangeal marilin.        MRI: NA    Arterial studies:    NA  R:  L:     RLE: Triphasic/triphasic   LLE: Triphasic/triphasic     Microbiology:  Results     Procedure Component Value Units Date/Time    Urinalysis [[id number]]     Order Status:  Sent Specimen:  Urine from Urine, Clean Catch     CULTURE WOUND W/ GRAM STAIN [[id number]] Collected:  09/02/18 0928    Order Status:  Completed Specimen:  Wound from Right Foot Updated:  09/02/18 0932    BLOOD CULTURE x2 [[id number]] Collected:  09/02/18 0821    Order Status:  Completed Specimen:  Blood from Peripheral Updated:  09/02/18 0850    Narrative:       Per Hospital Policy: Only change Specimen Src: to \"Line\" if  specified by physician order.    BLOOD CULTURE x2 [[id number]] Collected:  09/02/18 0842    Order Status:  Completed Specimen:  Blood from Peripheral Updated:  09/02/18 0845    Narrative:       Per Hospital Policy: Only change Specimen Src: to \"Line\" if  specified by physician order.           PHYSICAL EXAMINATION:     Vitals  /68   Pulse 72   Temp 36.6 °C (97.9 °F)   Resp 16   Ht 1.702 m (5' 7\")   Wt 101.9 kg (224 lb 10.4 oz)   SpO2 98%   Breastfeeding? No   BMI 35.18 kg/m²      Pain:        Patient resting comfortably    General Appearance:  Well developed, well nourished, in no acute distress    Lower Extremity Assessment:  Edema +1 RLE    Pulses: RLE- DP pulse +2 palpable; PT pulse +2 palpable           "      LLE- DP pulse +2 palpable; PT pulse +2 palpable     ROM dorsi/plantar  Structural /mechanical      Sensory Assessment  Feet Insensate to light touch    Wound Assessment:    Wound Characteristics                                                                            Location: Right Foot - 2nd Digit Initial Evaluation  Date:9/2/2018   Tissue Type and %: 100% Red   Periwound: Edematous / Erythemic   Drainage: Minimal Purulant   Exposed structures None   Wound Edges:   Attached   Odor: None   S&S of Infection:   Edema/Erythema   Edema: Localized at Site   Sensation: Insensate               Measurements: Initial Evaluation  Date:9/2/2018   Length (cm) 0.2   Width (cm) 1   Depth (cm)     Tract/undermine                    Procedures:       Debridement :  NA   Cleansed with:     NS                                                                     Periwound protected with: skin prep   Primary dressing: AQAG   Secondary Dressing: foam   Other: tape      NURSING TO CHANGE RIGHT TOE DRESSING EVERY 48 HOURS AND PRN FOR SATURATION OR DISLODGEMENT  Nursing to cleanse wound/periwound with Normal Saline (NS).  Pat periwound dry and apply skin prep/No Sting to periwound.  Let air dry for 1-2 minutes.  Apply a piece of AqAg (Hydrofiber Silver), cut to size, to the wound bed.  Cover with non adhesive foam, cut to size, and secure with hypafix tape.  Please take Weekly Wound Photos. Notify wound team if wound deteriorates or fails to progress.      Plan:      1. Labs\Imaging: Reviewed    2. Treatment:   Pt NPO @ midnight tomorrow for right 2nd toe amp with Dr Stevens Tuesday     Wound Care by Nursing, LPS to Follow.                           Dressing Orders Updated. Skin Care Updated.      Nursing to change every 48 hours and PRN for Saturation or Dislodgement     Antibiotics WAYNE CAMARENA when OOB wearing Shoe    3.Collaboration:      Diabetes Educator Involved: A1C is 13.2% Pt educated on keeping BS less than 150 to  control infection and promote wound healing     Ortho Techs involved: shoe ordered     IV Antibiotics per ID    4. Orthotics/Prosthetics:      pt to get orthotics/prosthetics  as OP, pt to wear shoes that do not rub on Wound sites       Anticipated discharge plans (X):   SNF:            Home Care:            Outpatient Wound Center:     X   Self Care:             Other:            TBD: X    Professional Collaboration: D/W:  [employer] Yellow Team, Bedside RN, Dr Stevens

## 2018-09-02 NOTE — PROGRESS NOTES
Off loading shoe was delivered and fitted to patient RLE.  If any further assistance needed, please call extension [phone] or place order for Ortho Technician assistance as a communication order in [employer].

## 2018-09-02 NOTE — ED TRIAGE NOTES
Kavitha Benson  43 y.o.  male  Chief Complaint   Patient presents with   • Wound Infection     right 2nd toe      Present to triage c/o right 2nd toe swelling and redness x 3 weeks.

## 2018-09-02 NOTE — H&P
"      Internal Medicine Admitting History and Physical    Note Author: Dwayne Garcia M.D.       Name Kavitha Benson     1975   Age/Sex 43 y.o. female   MRN [id number]   Code Status Full     After 5PM or if no immediate response to page, please call for cross-coverage  Attending/Team: Dr. Clifford/Betzaida See Patient List for primary contact information  Call [phone] to page    1st Call - Day Intern (R1):   Dr. Garcia 2nd Call - Day Sr. Resident (R2/R3):   Dr. Cannon       Chief Complaint:   Swelling of 2nd digit of right foot    HPI:    Ms. Kavitha Benson is a 43-year-old woman with no known past medical history, and no known medical visits other than for cellulitis of the lower extremity on 2018 at [facility].  She presented today with complaint of swelling of the second digit of right foot that has been present for the past 2-3 months.  She reports that over the last few weeks she has not been able to fit her right foot in her normal shoes.  She recalls some possible minor trauma to the toe some months ago while clipping her toenails.  She has some tenderness on the dorsum of the foot, but denies that the toe itself is painful, and denies any pain, warmth, erythema extending up the lower extremity.  She has tried soaking her foot in epsom salt baths, with no changes. She denies fevers, chills, aches, nausea/vomiting or malaise.    On presentation her blood glucose was 431, and hemoglobin A1c was found to be 13.2%.  She has never been told she was diabetic, and does not seek regular medical care.  She denies polyuria, polydipsia, any changes in sensation on the lower extremities, vision changes, or urinary or bowel changes. She does have a family history of diabetes in her father and paternal aunt.     She admits to using \"speed\", which she regularly smokes, last use on the day of admission.  She also reports recently injecting in her left arm.  She has multiple excoriations on " her extremities and back which she reports is from scratching herself, usually at the site of a purported pimple.  She admits to occasional alcohol use but does not elaborate further and denies regular use. She denies using tobacco. She reports that she has difficulty swallowing pills, but has no odynophagia or dysphagia with solids or liquids.     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     In the ED an x-ray of the right foot showed findings suggestive of osteomyelitis of the second digit, with involvement of the third and fourth digits to a lesser degree.  She was afebrile, non-tachycardic and non-tachypneic, with a WBC count of 12.6.  She was started on vancomycin and Unasyn and given IV fluids.       Review of Systems   Constitutional: Negative for chills, diaphoresis, fever, malaise/fatigue and weight loss.   HENT: Negative for congestion, ear pain, nosebleeds and sinus pain.    Eyes: Negative for blurred vision, double vision, photophobia, pain, discharge and redness.   Respiratory: Negative for cough, hemoptysis, sputum production, shortness of breath and wheezing.    Cardiovascular: Negative for chest pain, palpitations, orthopnea and claudication.   Gastrointestinal: Negative for abdominal pain, blood in stool, constipation, diarrhea, heartburn, melena, nausea and vomiting.   Genitourinary: Negative for dysuria, flank pain, frequency, hematuria and urgency.   Musculoskeletal: Negative for back pain, myalgias and neck pain.   Skin: Positive for itching.   Neurological: Negative for dizziness, sensory change, weakness and headaches.   Endo/Heme/Allergies: Negative for polydipsia. Does not bruise/bleed easily.             Past Medical History (Chronic medical problem, known complications and current treatment)    Denies any known past medical history     Past Surgical History:  History reviewed. No pertinent surgical history.    Current Outpatient Medications:  Home Medications     Reviewed by  "Sivan aJquez PhT (Pharmacy Tech) on 09/02/18 at 0929  Med List Status: Complete   Medication Last Dose Status        Patient Pritesh Taking any Medications                       Medication Allergy/Sensitivities:  No Known Allergies      Family History (mandatory)   Family History   Problem Relation Age of Onset   • Stroke Neg Hx    • Heart Disease Neg Hx        Social History (mandatory)   Social History     Social History   • Marital status: Single     Spouse name: N/A   • Number of children: N/A   • Years of education: N/A     Occupational History   • Not on file.     Social History Main Topics   • Smoking status: Never Smoker   • Smokeless tobacco: Never Used   • Alcohol use No   • Drug use: Yes      Comment: \"smokes speed\"   • Sexual activity: Not on file     Other Topics Concern   • Not on file     Social History Narrative   • No narrative on file     Living situation: Lives with roomate  PCP : Pcp Pt States None    Physical Exam     Vitals:    09/02/18 0656 09/02/18 0702 09/02/18 1031 09/02/18 1200   BP: 122/80  127/82 118/68   Pulse: 94  92 72   Resp: 16  16 16   Temp: 35.9 °C (96.7 °F)   36.6 °C (97.9 °F)   SpO2: 98%  98% 98%   Weight:  101.9 kg (224 lb 10.4 oz)     Height: 1.702 m (5' 7\")        Body mass index is 35.18 kg/m².  /68   Pulse 72   Temp 36.6 °C (97.9 °F)   Resp 16   Ht 1.702 m (5' 7\")   Wt 101.9 kg (224 lb 10.4 oz)   SpO2 98%   Breastfeeding? No   BMI 35.18 kg/m²   O2 therapy: Pulse Oximetry: 98 %, O2 Delivery: None (Room Air)    Physical Exam   Constitutional: She is oriented to person, place, and time and well-developed, well-nourished, and in no distress.   HENT:   Head: Normocephalic and atraumatic.   Mouth/Throat: No oropharyngeal exudate.   Poor dentition   Eyes: Pupils are equal, round, and reactive to light. EOM are normal. No scleral icterus.   Neck: Normal range of motion. Neck supple. No JVD present.   Cardiovascular:   Borderline tachycardic, slightly irregular " rate, systolic murmur best heard at right sternal border   Pulmonary/Chest: Effort normal and breath sounds normal. No respiratory distress. She has no wheezes. She has no rales. She exhibits no tenderness.   Abdominal: Bowel sounds are normal. She exhibits no distension and no mass. There is no tenderness. There is no rebound and no guarding.   Obese abdomen   Musculoskeletal: Normal range of motion.   Movement of 2nd and 3rd digit of right foot restricted by edema.    Lymphadenopathy:     She has no cervical adenopathy.   Neurological: She is alert and oriented to person, place, and time.   Sensation to crude touch intact and equal on LE   Skin:   2nd digit of right foot is extremely edematous, approximately 4 times normal size, pinkish hue, non-tender, slightly warm, skin break on plantar surface at interphalangeal joint. Some mild tenderness on dorsum of foot up to 6cm proximal from the phalange, diffuse mild warmth compared to contralateral foot, slightly erythematous. 3rd digit is slightly swollen as well, non-tender.    Scattered excoriations of variable size and age on extremities and back.    Psychiatric:   Often irritable during interview         Data Review       Old Records Request:   Deferred  Current Records review/summary: Completed    Lab Data Review:  Recent Results (from the past 24 hour(s))   WESTBREEREN SED RATE    Collection Time: 09/02/18  8:21 AM   Result Value Ref Range    Sed Rate Oumarren 94 (H) 0 - 20 mm/hour   CBC WITH DIFFERENTIAL    Collection Time: 09/02/18  8:21 AM   Result Value Ref Range    WBC 12.6 (H) 4.8 - 10.8 K/uL    RBC 4.87 4.20 - 5.40 M/uL    Hemoglobin 13.6 12.0 - 16.0 g/dL    Hematocrit 41.4 37.0 - 47.0 %    MCV 85.0 81.4 - 97.8 fL    MCH 27.9 27.0 - 33.0 pg    MCHC 32.9 (L) 33.6 - 35.0 g/dL    RDW 37.9 35.9 - 50.0 fL    Platelet Count 400 164 - 446 K/uL    MPV 9.6 9.0 - 12.9 fL    Neutrophils-Polys 71.20 44.00 - 72.00 %    Lymphocytes 20.40 (L) 22.00 - 41.00 %     Monocytes 5.90 0.00 - 13.40 %    Eosinophils 1.90 0.00 - 6.90 %    Basophils 0.30 0.00 - 1.80 %    Immature Granulocytes 0.30 0.00 - 0.90 %    Nucleated RBC 0.00 /100 WBC    Neutrophils (Absolute) 9.00 (H) 2.00 - 7.15 K/uL    Lymphs (Absolute) 2.58 1.00 - 4.80 K/uL    Monos (Absolute) 0.74 0.00 - 0.85 K/uL    Eos (Absolute) 0.24 0.00 - 0.51 K/uL    Baso (Absolute) 0.04 0.00 - 0.12 K/uL    Immature Granulocytes (abs) 0.04 0.00 - 0.11 K/uL    NRBC (Absolute) 0.00 K/uL   COMP METABOLIC PANEL    Collection Time: 09/02/18  8:21 AM   Result Value Ref Range    Sodium 130 (L) 135 - 145 mmol/L    Potassium 3.9 3.6 - 5.5 mmol/L    Chloride 93 (L) 96 - 112 mmol/L    Co2 32 20 - 33 mmol/L    Anion Gap 5.0 0.0 - 11.9    Glucose 431 (H) 65 - 99 mg/dL    Bun 13 8 - 22 mg/dL    Creatinine 0.64 0.50 - 1.40 mg/dL    Calcium 9.4 8.5 - 10.5 mg/dL    AST(SGOT) 11 (L) 12 - 45 U/L    ALT(SGPT) 8 2 - 50 U/L    Alkaline Phosphatase 113 (H) 30 - 99 U/L    Total Bilirubin 0.4 0.1 - 1.5 mg/dL    Albumin 3.9 3.2 - 4.9 g/dL    Total Protein 8.8 (H) 6.0 - 8.2 g/dL    Globulin 4.9 (H) 1.9 - 3.5 g/dL    A-G Ratio 0.8 g/dL   CRP QUANTITIVE (NON-CARDIAC)    Collection Time: 09/02/18  8:21 AM   Result Value Ref Range    Stat C-Reactive Protein 1.85 (H) 0.00 - 0.75 mg/dL   ESTIMATED GFR    Collection Time: 09/02/18  8:21 AM   Result Value Ref Range    GFR If African American >60 >60 mL/min/1.73 m 2    GFR If Non African American >60 >60 mL/min/1.73 m 2   TSH (Thyroid Stimulating Hormone)    Collection Time: 09/02/18  8:21 AM   Result Value Ref Range    TSH 5.560 (H) 0.380 - 5.330 uIU/mL   Hemoglobin A1c    Collection Time: 09/02/18  8:21 AM   Result Value Ref Range    Glycohemoglobin 13.2 (H) 0.0 - 5.6 %    Est Avg Glucose 332 mg/dL   Magnesium    Collection Time: 09/02/18  8:21 AM   Result Value Ref Range    Magnesium 1.8 1.5 - 2.5 mg/dL   EKG    Collection Time: 09/02/18 12:19 PM   Result Value Ref Range    Report       [facility]    Test  Date:  2018  Pt Name:    GIGI BROCK                Department: ER  MRN:        [id number]                      Room:       T335  Gender:     Female                       Technician: TXMOOSE  :        1975                   Requested By:YANNICK MANZANO  Order #:    [id number]                    Reading MD:    Measurements  Intervals                                Axis  Rate:       65                           P:          -19  SD:         164                          QRS:        50  QRSD:       96                           T:          38  QT:         440  QTc:        458    Interpretive Statements  SINUS RHYTHM  No previous ECG available for comparison     LACTIC ACID    Collection Time: 18 12:20 PM   Result Value Ref Range    Lactic Acid 0.7 0.5 - 2.0 mmol/L       Imaging/Procedures Review:    Independant Imaging Review: Completed  DX-FOOT-COMPLETE 3+ RIGHT   Final Result      Extensive soft tissue swelling with extensive bone destruction involving the second middle and distal phalanges suggesting osteomyelitis with a destructive neoplastic process seeming less likely.      Similar findings are also noted to a lesser degree involving the third and fourth phalangeal marilin.           EKG:   EKG Independant Review: Completed  QTc:458, HR: 65, Normal Sinus Rhythm, no ST/T changes     Records reviewed and summarized in current documentation :  No  [employer] teaching service handout given to patient:  No         Assessment/Plan     * Osteomyelitis (HCC)   Assessment & Plan    - Extreme edema of 2nd digit of right foot, 2-3 months duration following possible trauma from toenail clipping  - Involvement of 3rd and 4th digits to a lesser degree  - X-ray right foot highly suggestive of osteomyelitis  - Hyperglycemic - Glucose 431 on presentation, HbA1C 13.2%, no prior known history of DM    Plan  - Unasyn and Vancomycin  - Limb Preservation Service consulted  - Blood cultures x2, consider echo if bacteremia present  "here in the setting of heart murmur of unknown onset and history of IVDU  - Wound swab cultures  - Tylenol PRN pain  - Continue to monitor        DM (diabetes mellitus) (HCC)   Assessment & Plan    No known prior history of DM  - Blood Glucose 431 on presentation  - Hgb 13.2  - Likely etiology for osteomyelitis   - Denies polyuria, polydipsia, changes of sensation    Plan  - Insulin correctional scale, no basal control yet as insulin naive   - C-peptide to assess if possible MIGUELITO  - Urinalysis with  Microalbuminuria assay for diabetic nephropathy, consider initiating ACE-I  - Diabetic diet  - Diabetes education        Substance abuse   Assessment & Plan    Reports using \"speed\" often, last use on day of admission (9/2/2018)  - Smoking is most often used route  - Rarely injects, but has recent history of injection in arm  - Occasional alcohol use    - Urine drug screen  - Discuss HIV and Hepatitis screening  - Drug abuse counseling        Abnormal serum level of alkaline phosphatase   Assessment & Plan    Likely secondary to osteomyelitis  - Normal transaminase levels  - Consider ALP subtypes         Leukocytosis   Assessment & Plan    Secondary to infection (osteomyelitis)  - WBC 12.6  - Treat for osteomyelitis (unasyn and vancomycin)  - Continue to monitor         Hyponatremia   Assessment & Plan    Likely pseudohyponatremia due to hyperglycemic state  - Corrected sodium is 135, within normal range  - Continue to monitor            Anticipated Hospital stay:  >2 midnights        Quality Measures  Quality-Core Measures   Reviewed items::  EKG reviewed, Labs reviewed, Medications reviewed and Radiology images reviewed  Duarte catheter::  No Duarte  DVT prophylaxis pharmacological::  Enoxaparin (Lovenox)    PCP: Pcp Pt States None    "

## 2018-09-02 NOTE — PROGRESS NOTES
"Pharmacy Kinetics 43 y.o. female on vancomycin day # 1 2018    Currently on Vancomycin 2500 mg iv loading dose x1    Indication for Treatment: osteomyelitis of right second toe    Pertinent history per medical record: Admitted on 2018 for redness, swelling, and some tenderness of right foot. X-ray in ED showed osteomyelitis of the second toe of the right foot. Cultures sent and Vanco/Unasyn started.  History of IV drug abuse, recently used meth. Glucose in , Hemoglobin A1C 13.2, not previously diagnosed with diabetes mellitus.     Other antibiotics: Unasyn 3 grams iv every 6 hours    Allergies: Patient has no known allergies.     List concerns for renal function: obesity (BMI 35),     Pertinent cultures to date:   18 blood, peripheral cultures sent  18 Wound culture sent    Recent Labs      18   0821   WBC  12.6*   NEUTSPOLYS  71.20     Recent Labs      18   0821   BUN  13   CREATININE  0.64   ALBUMIN  3.9     No results for input(s): VANCOTROUGH, VANCOPEAK, VANCORANDOM in the last 72 hours.No intake or output data in the 24 hours ending 18 1533   Blood pressure 118/68, pulse 72, temperature 36.6 °C (97.9 °F), resp. rate 16, height 1.702 m (5' 7\"), weight 101.9 kg (224 lb 10.4 oz), SpO2 98 %, not currently breastfeeding. Temp (24hrs), Av.3 °C (97.3 °F), Min:35.9 °C (96.7 °F), Max:36.6 °C (97.9 °F)      A/P   1. Vancomycin dose change: begin maintenance dosing of 1700mg iv every 12 hours  2. Next vancomycin level: 1-2 days  3. Goal trough: 12-16 mcg/mL  4. Comments: Some concerns for Vanco accumulation due to body habitus. Will check a trough level in 1-2 days. Look to de-escalate antibiotic therapy once cultures result    Joselin Gabriel, PharmD  "

## 2018-09-02 NOTE — ED NOTES
Pt complains of right second toe pain x 4 weeks. Right second toe is very edematous and red with serous drainage. Pt denies pain at this time and ambulatory without difficulty. Pt denies fever or chills. No hx of DM. VSS. NAD. Side rails raised for safety. Call light within reach. Will continue to monitor.

## 2018-09-02 NOTE — ED NOTES
Present to ED because of Fall? (syncope, seizure, ALOC)  NO    Age > 70? NO    Altered Mental Status? NO    Impaired Mobility? NO    Nursing Judgement (weakness, dizziness)? NO    Interventions:  Move patient closer to nurses' station  Familiarize the patient with environment  Place call light within reach and demonstrate call light use  Keep patient's personal possessions within patient safe reach  Place stretcher in low position and brakes locked  Keep floor surfaces clean and dry  Keep patient care areas uncluttered  Assess patient hourly for pain, personal needs, position change, and call light access.

## 2018-09-03 PROBLEM — M86.171 ACUTE OSTEOMYELITIS OF TOE OF RIGHT FOOT (HCC): Status: ACTIVE | Noted: 2018-09-02

## 2018-09-03 LAB
ABO GROUP BLD: NORMAL
ABO GROUP BLD: NORMAL
ALBUMIN SERPL BCP-MCNC: 2.7 G/DL (ref 3.2–4.9)
ALBUMIN/GLOB SERPL: 0.7 G/DL
ALP SERPL-CCNC: 78 U/L (ref 30–99)
ALT SERPL-CCNC: 7 U/L (ref 2–50)
ANION GAP SERPL CALC-SCNC: 5 MMOL/L (ref 0–11.9)
AST SERPL-CCNC: 9 U/L (ref 12–45)
BASOPHILS # BLD AUTO: 0.5 % (ref 0–1.8)
BASOPHILS # BLD: 0.04 K/UL (ref 0–0.12)
BILIRUB SERPL-MCNC: 0.5 MG/DL (ref 0.1–1.5)
BLD GP AB SCN SERPL QL: NORMAL
BUN SERPL-MCNC: 10 MG/DL (ref 8–22)
CALCIUM SERPL-MCNC: 8.4 MG/DL (ref 8.5–10.5)
CHLORIDE SERPL-SCNC: 100 MMOL/L (ref 96–112)
CHOLEST SERPL-MCNC: 126 MG/DL (ref 100–199)
CO2 SERPL-SCNC: 30 MMOL/L (ref 20–33)
CREAT SERPL-MCNC: 0.58 MG/DL (ref 0.5–1.4)
EOSINOPHIL # BLD AUTO: 0.23 K/UL (ref 0–0.51)
EOSINOPHIL NFR BLD: 2.8 % (ref 0–6.9)
ERYTHROCYTE [DISTWIDTH] IN BLOOD BY AUTOMATED COUNT: 38.6 FL (ref 35.9–50)
GLOBULIN SER CALC-MCNC: 4.1 G/DL (ref 1.9–3.5)
GLUCOSE BLD-MCNC: 260 MG/DL (ref 65–99)
GLUCOSE BLD-MCNC: 284 MG/DL (ref 65–99)
GLUCOSE BLD-MCNC: 294 MG/DL (ref 65–99)
GLUCOSE BLD-MCNC: 298 MG/DL (ref 65–99)
GLUCOSE SERPL-MCNC: 294 MG/DL (ref 65–99)
HCT VFR BLD AUTO: 37.8 % (ref 37–47)
HDLC SERPL-MCNC: 30 MG/DL
HGB BLD-MCNC: 12.5 G/DL (ref 12–16)
IMM GRANULOCYTES # BLD AUTO: 0.02 K/UL (ref 0–0.11)
IMM GRANULOCYTES NFR BLD AUTO: 0.2 % (ref 0–0.9)
LDLC SERPL CALC-MCNC: 67 MG/DL
LYMPHOCYTES # BLD AUTO: 2.14 K/UL (ref 1–4.8)
LYMPHOCYTES NFR BLD: 25.7 % (ref 22–41)
MAGNESIUM SERPL-MCNC: 1.8 MG/DL (ref 1.5–2.5)
MCH RBC QN AUTO: 28.3 PG (ref 27–33)
MCHC RBC AUTO-ENTMCNC: 33.1 G/DL (ref 33.6–35)
MCV RBC AUTO: 85.7 FL (ref 81.4–97.8)
MONOCYTES # BLD AUTO: 0.6 K/UL (ref 0–0.85)
MONOCYTES NFR BLD AUTO: 7.2 % (ref 0–13.4)
NEUTROPHILS # BLD AUTO: 5.29 K/UL (ref 2–7.15)
NEUTROPHILS NFR BLD: 63.6 % (ref 44–72)
NRBC # BLD AUTO: 0 K/UL
NRBC BLD-RTO: 0 /100 WBC
PLATELET # BLD AUTO: 316 K/UL (ref 164–446)
PMV BLD AUTO: 9.7 FL (ref 9–12.9)
POTASSIUM SERPL-SCNC: 3.9 MMOL/L (ref 3.6–5.5)
PROT SERPL-MCNC: 6.8 G/DL (ref 6–8.2)
RBC # BLD AUTO: 4.41 M/UL (ref 4.2–5.4)
RH BLD: NORMAL
RH BLD: NORMAL
SODIUM SERPL-SCNC: 135 MMOL/L (ref 135–145)
TRIGL SERPL-MCNC: 144 MG/DL (ref 0–149)
WBC # BLD AUTO: 8.3 K/UL (ref 4.8–10.8)

## 2018-09-03 PROCEDURE — 80061 LIPID PANEL: CPT

## 2018-09-03 PROCEDURE — 83735 ASSAY OF MAGNESIUM: CPT

## 2018-09-03 PROCEDURE — 700105 HCHG RX REV CODE 258: Performed by: STUDENT IN AN ORGANIZED HEALTH CARE EDUCATION/TRAINING PROGRAM

## 2018-09-03 PROCEDURE — 86901 BLOOD TYPING SEROLOGIC RH(D): CPT

## 2018-09-03 PROCEDURE — 86850 RBC ANTIBODY SCREEN: CPT

## 2018-09-03 PROCEDURE — 86900 BLOOD TYPING SEROLOGIC ABO: CPT

## 2018-09-03 PROCEDURE — 82962 GLUCOSE BLOOD TEST: CPT | Mod: 91

## 2018-09-03 PROCEDURE — 700105 HCHG RX REV CODE 258: Performed by: INTERNAL MEDICINE

## 2018-09-03 PROCEDURE — 84681 ASSAY OF C-PEPTIDE: CPT

## 2018-09-03 PROCEDURE — 770006 HCHG ROOM/CARE - MED/SURG/GYN SEMI*

## 2018-09-03 PROCEDURE — 700111 HCHG RX REV CODE 636 W/ 250 OVERRIDE (IP): Performed by: INTERNAL MEDICINE

## 2018-09-03 PROCEDURE — 80053 COMPREHEN METABOLIC PANEL: CPT

## 2018-09-03 PROCEDURE — 85025 COMPLETE CBC W/AUTO DIFF WBC: CPT

## 2018-09-03 PROCEDURE — 700102 HCHG RX REV CODE 250 W/ 637 OVERRIDE(OP): Performed by: STUDENT IN AN ORGANIZED HEALTH CARE EDUCATION/TRAINING PROGRAM

## 2018-09-03 PROCEDURE — 700111 HCHG RX REV CODE 636 W/ 250 OVERRIDE (IP): Performed by: STUDENT IN AN ORGANIZED HEALTH CARE EDUCATION/TRAINING PROGRAM

## 2018-09-03 PROCEDURE — 99232 SBSQ HOSP IP/OBS MODERATE 35: CPT | Performed by: INTERNAL MEDICINE

## 2018-09-03 PROCEDURE — 36415 COLL VENOUS BLD VENIPUNCTURE: CPT

## 2018-09-03 RX ORDER — INSULIN GLARGINE 100 [IU]/ML
15 INJECTION, SOLUTION SUBCUTANEOUS
Status: DISCONTINUED | OUTPATIENT
Start: 2018-09-03 | End: 2018-09-04

## 2018-09-03 RX ADMIN — AMPICILLIN SODIUM AND SULBACTAM SODIUM 3 G: 2; 1 INJECTION, POWDER, FOR SOLUTION INTRAMUSCULAR; INTRAVENOUS at 17:40

## 2018-09-03 RX ADMIN — INSULIN GLARGINE 15 UNITS: 100 INJECTION, SOLUTION SUBCUTANEOUS at 11:57

## 2018-09-03 RX ADMIN — AMPICILLIN SODIUM AND SULBACTAM SODIUM 3 G: 2; 1 INJECTION, POWDER, FOR SOLUTION INTRAMUSCULAR; INTRAVENOUS at 00:00

## 2018-09-03 RX ADMIN — INSULIN HUMAN 3 UNITS: 100 INJECTION, SOLUTION PARENTERAL at 20:29

## 2018-09-03 RX ADMIN — SODIUM CHLORIDE: 9 INJECTION, SOLUTION INTRAVENOUS at 09:05

## 2018-09-03 RX ADMIN — AMPICILLIN SODIUM AND SULBACTAM SODIUM 3 G: 2; 1 INJECTION, POWDER, FOR SOLUTION INTRAMUSCULAR; INTRAVENOUS at 13:09

## 2018-09-03 RX ADMIN — VANCOMYCIN HYDROCHLORIDE 1700 MG: 100 INJECTION, POWDER, LYOPHILIZED, FOR SOLUTION INTRAVENOUS at 10:13

## 2018-09-03 RX ADMIN — AMPICILLIN SODIUM AND SULBACTAM SODIUM 3 G: 2; 1 INJECTION, POWDER, FOR SOLUTION INTRAMUSCULAR; INTRAVENOUS at 06:16

## 2018-09-03 RX ADMIN — INSULIN HUMAN 3 UNITS: 100 INJECTION, SOLUTION PARENTERAL at 11:55

## 2018-09-03 RX ADMIN — VANCOMYCIN HYDROCHLORIDE 1700 MG: 100 INJECTION, POWDER, LYOPHILIZED, FOR SOLUTION INTRAVENOUS at 22:10

## 2018-09-03 RX ADMIN — INSULIN HUMAN 3 UNITS: 100 INJECTION, SOLUTION PARENTERAL at 17:36

## 2018-09-03 RX ADMIN — ENOXAPARIN SODIUM 40 MG: 100 INJECTION SUBCUTANEOUS at 11:59

## 2018-09-03 ASSESSMENT — ENCOUNTER SYMPTOMS
NAUSEA: 0
CHILLS: 0
VOMITING: 0
HEADACHES: 0
DOUBLE VISION: 0
ORTHOPNEA: 0
ABDOMINAL PAIN: 0
DIZZINESS: 0
POLYDIPSIA: 0
TREMORS: 0
PALPITATIONS: 0
TINGLING: 0
BLURRED VISION: 0
FEVER: 0
SHORTNESS OF BREATH: 0

## 2018-09-03 ASSESSMENT — PAIN SCALES - GENERAL: PAINLEVEL_OUTOF10: 0

## 2018-09-03 NOTE — PROGRESS NOTES
Internal Medicine Interval Note  Note Author: Marquita Aguilar D.O.     Name Kavitha Benson     1975   Age/Sex 43 y.o. female   MRN [id number]   Code Status Full     After 5PM or if no immediate response to page, please call for cross-coverage  Attending/Team: Dr. Clifford/TIMOTHY Huston See Patient List for primary contact information  Call [phone] to page    1st Call - Day Intern (R1):   Dr. Aguilar 2nd Call - Day Sr. Resident (R2/R3):   Dr. Cannon         Reason for interval visit  (Principal Problem)   Osteomyelitis second digit of the right foot       Interval Problem Daily Status Update  (24 hours, problem oriented, brief subjective history, new lab/imaging data pertinent to that problem)   No acute overnight events. Pt has remained afebrile.   Laying in bed. Pt states that she is in no pain this morning. She denies chest pain, fevers and chills, headaches, vision changes or abdominal pain.     Osteomyelitis: patient tentatively scheduled for amputation of second digit of right foot on 18     Diabetes: Patient was on a low sliding scale insulin and received 8 units over the past day. POC glucose have been 290s-300s. Patient is eating and drinking well.     Review of Systems   Constitutional: Negative for chills and fever.   Eyes: Negative for blurred vision and double vision.   Respiratory: Negative for shortness of breath.    Cardiovascular: Negative for chest pain, palpitations, orthopnea and leg swelling.   Gastrointestinal: Negative for abdominal pain, nausea and vomiting.   Genitourinary: Negative for dysuria, frequency and urgency.   Musculoskeletal: Negative for joint pain.   Neurological: Negative for dizziness, tingling, tremors and headaches.   Endo/Heme/Allergies: Negative for polydipsia.     Disposition/Barriers to discharge:   Inpatient being treated for Osteomyelitis of second digit of right foot awaiting amputation    Consultants/Specialty  Orthopedics  Limb Preservation  Service  PCP: Pcp Pt States None    Quality Measures  Quality-Core Measures   Reviewed items::  EKG reviewed, Labs reviewed, Medications reviewed and Radiology images reviewed  Duarte catheter::  No Duarte  DVT prophylaxis pharmacological::  Enoxaparin (Lovenox)      Physical Exam       Vitals:    09/02/18 1526 09/02/18 2017 09/03/18 0400 09/03/18 0900   BP: 125/73 108/58 122/75 109/52   Pulse: 65 68 64 65   Resp: 16 17 16 14   Temp: 36.2 °C (97.2 °F) 36.3 °C (97.4 °F) 36.4 °C (97.5 °F) 36.7 °C (98.1 °F)   SpO2: 94% 99% 90% 96%   Weight:       Height:         Body mass index is 35.18 kg/m².    Oxygen Therapy:  Pulse Oximetry: 96 %, O2 (LPM): 0, O2 Delivery: None (Room Air)    Physical Exam   Constitutional: She is oriented to person, place, and time. No distress.   HENT:   Head: Normocephalic and atraumatic.   Mouth/Throat: Oropharynx is clear and moist. No oropharyngeal exudate.   Eyes: EOM are normal. No scleral icterus.   Cardiovascular: Normal rate, regular rhythm and intact distal pulses.    Murmur (II/IV holosystolic murmur heard best at left sternal border) heard.  Pulmonary/Chest: Effort normal and breath sounds normal. No respiratory distress. She has no wheezes.   Abdominal: Soft. She exhibits no distension. There is no tenderness. There is no rebound and no guarding.   Musculoskeletal: She exhibits no edema.   Right second toe wrapped in bandage   Neurological: She is alert and oriented to person, place, and time.   Sensation equal and intact in bilateral lower extremities. 5/5 motor strength in lower extremities bilaterally.   Skin: She is not diaphoretic.   Multiple excoriations on lower extremities   Psychiatric: She has a flat affect.         Assessment/Plan   Acute osteomyelitis of toe of right foot (HCC)  - Osteomyelitis of second digit of right foot superimposed by cellulitis.   - Preceded by possible trauma from toenail clipping couple of months ago   - 9/2/18 Right foot X Ray: Extensive soft tissue  "swelling with extensive bone destruction involving the second middle and distal phalanges suggesting osteomyelitis with similar findings to a lesser degree involving the third and fourth phalangeal marilin  - complicated with diagnosis of new onset of Diabetes  - Wound cx: rare gram positive cocci   - Blood cx: NGTD   - WBC 12.6 on admission now 8.3   - No systemic signs of infection at this time  - continue Unasyn and Vancomycin  - Limb Preservation service following, planned for amputation on 9/4/18   - NPO at midnight   - Check PT, aPTT, type and screen    Uncontrolled diabetes mellitus (HCC)  - Newly diagnosed with no known prior history of DM  - HbA1C 13.2 on admission   - now presenting with osteomyelitis of second toe of right foot with superimposed cellulitis  - No microalbuminuria or nephropathy   - suspect some component of neuropathy since patient is in no pain even though her sensation is intact bilaterally in her lower extremities   - Lipid Profile: unremarkable  - POCs 290s-300   - 15u Lantus at noon with SSI with meals  - Diabetes education     Hyponatremia  -Pseudohyponatremia due to hyperglycemic state on admission   - Corrected for hyperglycemia, sodium is 138, within normal range  - now resolved     Leukocytosis  - secondary to osteomyelitis   - WBC 12.6 on admission   - WBC now WNL   - Resolved, will continue to monitor     Abnormal serum level of alkaline phosphatase  - present on admission secondary to osteomyelitis   - now WNL, will continue to monitor     Substance abuse  - methamphetamine (\"speed\") last used on 9/2/18  - UDS + for amphetamines   - Drug abuse counseling   - will continue monitor for signs of withdrawal  - hx of IVDU, consider screening for Hepatitis C and HIV    "

## 2018-09-03 NOTE — PROGRESS NOTES
"Pharmacy Kinetics 43 y.o. female on vancomycin day # 1   9/3/2018    Currently on Vancomycin 1700 mg iv q12hr (10, 22)   : Vanco load 2500 mg IV at 1039    Indication for Treatment: Empiric - R middle toe abscess/osteomyelitis    Pertinent history per medical record: Admitted on 2018 for redness, swelling, and tenderness of right foot. Patient's middle toe markedly swollen, imaging revealed osteomyelitis of the second toe of the right foot. Empiric antibiotics initiated for treatment of patient's foot infection, amputation planned .    Other antibiotics: ampicillin/sulbactam 3 g IV q6hrs    Allergies: Patient has no known allergies.     Concerns for renal function include obesity (BMI 35 kg/m 2), albumin 2.7 & poorly controlled DM (A1c = 13.2).    Pertinent cultures to date:   : R toe wound - Moderate growth Staphylococcus aureus, sensitivities pending   : peripheral blood cx X2 - NGTD    Imagin/2: R foot xray - Extensive soft tissue swelling with extensive bone destruction involving the second middle and distal phalanges suggesting osteomyelitis with a destructive neoplastic process seeming less likely. Similar findings are also noted to a lesser degree involving the third and fourth phalangeal marilin.    Recent Labs      18   0821  18   0340   WBC  12.6*  8.3   NEUTSPOLYS  71.20  63.60     Recent Labs      18   0821  18   0340   BUN  13  10   CREATININE  0.64  0.58   ALBUMIN  3.9  2.7*     No results for input(s): VANCOTROUGH, VANCOPEAK, VANCORANDOM in the last 72 hours.    Intake/Output Summary (Last 24 hours) at 18 1537  Last data filed at 18 0800   Gross per 24 hour   Intake              628 ml   Output                0 ml   Net              628 ml      Blood pressure 109/52, pulse 65, temperature 36.7 °C (98.1 °F), resp. rate 14, height 1.702 m (5' 7\"), weight 101.9 kg (224 lb 10.4 oz), SpO2 96 %, not currently breastfeeding. Temp (24hrs), Av.5 °C " (97.7 °F), Min:36.3 °C (97.4 °F), Max:36.7 °C (98.1 °F)      A/P   1. Vancomycin dose change: Continue current regimen  2. Next vancomycin level: 9/4 at 0930  3. Goal trough: 12-16 mcg/mL  4. Comments: Patient remains on antibiotics for R foot osteo, wound team consulting & patient is scheduled for amputation of her second toe tomorrow. Patient's renal indices stable since last evaluation, wound cultures positive for staph pending final sensitivities. Will continue current vanco regimen and plan trough in AM to evaluate and adjust as necessary. Will continue to follow cultures and recommend de-escalation of antibiotics if continued MRSA coverage is no longer indicated.    Pharmacy will continue to follow.     Josefina Parker, FadiaD

## 2018-09-03 NOTE — PROGRESS NOTES
LIMB PRESERVATION SERVICE INITIAL CONSULT    REASON FOR LPS CONSULTATION: Right Foot - 2nd Digit Edema\Erythema    History of Present Illness:     Kavitha Benson is a 43 y.o. female admitted 9/2/2018, with a past medical history that includes uncontrolled type 2 diabetes  admitted for Diabetes ([facility])  Cellulitis and abscess of toe of right foot for which LPS has been consulted for. This wound is chronic per the pt but has gotten worse in the past three to four weeks. The patient is a poor historian though and is vague on how long the initial onset of the toe infection was. The pt states the have had multiple toe infections of the right 2nd and 3rd digits for which they have received oral antibiotics for in the past and the wounds resolved. The pt also states the broke their right 4th toe but never received medical attention. They treat their wounds with epsom salts baths but the wounds have failed to improve.   IV antibiotics were started on this admission.  Infectious diseases has been consulted. Dr Loya will be contacted    Xray has been done  MRI has not been done  Ortho will be seen Tuesday - Dr Stevens  Pt states they did not know they were diabetec.  Pt does not check blood sugars.  They have not had previous diabetes education.  They do have numbness in their feet.  They have no diabetic footwear. They do not check their feet routinely. They work as a .       DIAGNOSTIC DATA:   A1C:  Lab Results   Component Value Date/Time    HBA1C 13.2 (H) 09/02/2018 08:21 AM          LAST Blood glucose:   294    WBC   Date/Time Value Ref Range Status   09/03/2018 03:40 AM 8.3 4.8 - 10.8 K/uL Final     Recent Labs      09/02/18   0821 09/03/18   0340   WBC  12.6*  8.3   RBC  4.87  4.41   HEMOGLOBIN  13.6  12.5   HEMATOCRIT  41.4  37.8   MCV  85.0  85.7   MCH  27.9  28.3   MCHC  32.9*  33.1*   RDW  37.9  38.6   PLATELETCT  400  316   MPV  9.6  9.7     Recent Labs      09/02/18   0821 09/03/18   0340  "  SODIUM  130*  135   POTASSIUM  3.9  3.9   CHLORIDE  93*  100   CO2  32  30   GLUCOSE  431*  294*   BUN  13  10     Microbiology:  Results     Procedure Component Value Units Date/Time    CULTURE WOUND W/ GRAM STAIN [[id number]]  (Abnormal) Collected:  09/02/18 0928    Order Status:  Completed Specimen:  Wound from Right Foot Updated:  09/03/18 1335     Significant Indicator POS (POS)     Source WND     Site Right Second Toe     Culture Result Wound -- (A)     Gram Stain Result Few WBCs.  Rare Gram positive cocci.       Culture Result Wound Staphylococcus aureus  Moderate growth   (A)    BLOOD CULTURE x2 [[id number]] Collected:  09/02/18 0821    Order Status:  Completed Specimen:  Blood from Peripheral Updated:  09/03/18 0852     Significant Indicator NEG     Source BLD     Site PERIPHERAL     Blood Culture No Growth    Note: Blood cultures are incubated for 5 days and  are monitored continuously.Positive blood cultures  are called to the RN and reported as soon as  they are identified.      Narrative:       Per Hospital Policy: Only change Specimen Src: to \"Line\" if  specified by physician order.    BLOOD CULTURE x2 [[id number]] Collected:  09/02/18 0842    Order Status:  Completed Specimen:  Blood from Peripheral Updated:  09/03/18 0852     Significant Indicator NEG     Source BLD     Site PERIPHERAL     Blood Culture No Growth    Note: Blood cultures are incubated for 5 days and  are monitored continuously.Positive blood cultures  are called to the RN and reported as soon as  they are identified.      Narrative:       Per Hospital Policy: Only change Specimen Src: to \"Line\" if  specified by physician order.    GRAM STAIN [[id number]] Collected:  09/02/18 0928    Order Status:  Completed Specimen:  Wound Updated:  09/02/18 2201     Significant Indicator .     Source WND     Site Right Second Toe     Gram Stain Result Few WBCs.  Rare Gram positive cocci.      Urinalysis [[id number]]  (Abnormal) Collected:  09/02/18 " "1800    Order Status:  Completed Specimen:  Urine from Urine, Clean Catch Updated:  09/02/18 1822     Color Yellow     Character Clear     Specific Gravity 1.030     Ph 6.0     Glucose >=1000 (A) mg/dL      Ketones Negative mg/dL      Protein Negative mg/dL      Bilirubin Negative     Urobilinogen, Urine 1.0     Nitrite Negative     Leukocyte Esterase Negative     Occult Blood Negative     Micro Urine Req see below     Comment: Microscopic examination not performed when specimen is clear  and chemically negative for protein, blood, leukocyte esterase  and nitrite.         Narrative:       Culture if indicated           PHYSICAL EXAMINATION:     Vitals  /52   Pulse 65   Temp 36.7 °C (98.1 °F)   Resp 14   Ht 1.702 m (5' 7\")   Wt 101.9 kg (224 lb 10.4 oz)   SpO2 96%   Breastfeeding? No   BMI 35.18 kg/m²      Pain:        Patient resting comfortably    Wound Assessment:     Wound Characteristics                                                         Location: Right Foot - 2nd Digit Initial Evaluation  Date:9/2/2018 9/3/2018     Tissue Type and %: 100% Red 100% Red   Periwound: Edematous / Erythemic Edematous / Erythemic   Drainage: Minimal Purulant Minimal Purulant   Exposed structures None None   Wound Edges:   Attached Attached   Odor: None None   S&S of Infection:   Edema/Erythema Edema/Erythema   Edema: Localized at Site Localized at Site   Sensation: Insensate Insensate      Measurements: Initial Evaluation  Date:9/2/2018   Length (cm) 0.2   Width (cm) 1   Depth (cm)     Tract/undermine                        Procedures:                   Debridement :  NA              Cleansed with:     NS                                                                                Periwound protected with: skin prep              Primary dressing: AQAG              Secondary Dressing: foam              Other: tape        NURSING TO CHANGE RIGHT TOE DRESSING EVERY 48 HOURS AND PRN FOR SATURATION OR " DISLODGEMENT  Nursing to cleanse wound/periwound with Normal Saline (NS).  Pat periwound dry and apply skin prep/No Sting to periwound.  Let air dry for 1-2 minutes.  Apply a piece of AqAg (Hydrofiber Silver), cut to size, to the wound bed.  Cover with non adhesive foam, cut to size, and secure with hypafix tape.  Please take Weekly Wound Photos. Notify wound team if wound deteriorates or fails to progress.        Plan:       1. Labs\Imaging:         Reviewed     2. Treatment:               Pt NPO @ midnight tonight for right 2nd toe amp with Dr Stevens Tuesday @ 215                                      Cont Wound Care by Nursing, LPS to Follow.                                      Pt wanted dressing changed today - dressing was clean dry and intact. - Changed dressing per pt request.        Nursing to change every 48 hours and PRN for Saturation or Dislodgement                                      Antibiotics ID Boniner                                      HWB when OOB wearing Shoe     3.Collaboration:                                                 Diabetes Educator Involved: A1C is 13.2% Pt educated on keeping BS less than 150 to control infection and promote wound healing                                      Ortho Techs involved: shoe at bedside                                      IV Antibiotics per ID     4. Orthotics/Prosthetics:                                       pt to get orthotics/prosthetics  as OP, pt to wear shoes that do not rub on Wound sites         Anticipated discharge plans (X):              SNF:                       Home Care:                       Outpatient Wound Center:     X              Self Care:                        Other:                       TBD: X     Professional Collaboration: D/W:  [employer] Yellow Team, Bedside RN, Dr Stevens

## 2018-09-03 NOTE — PROGRESS NOTES
Assumed care of pt after receiving report from Night shift RN, discussed plan of care. A&Ox4. RA, VS stable.   Pt states pain 0/10. Medicated per MAR.  Last BM 9/3/18 today,  No N/V.  Continent of bowel, bladder; having good urine output.    Skin intact; x  R 2nd toe, dressing in place, clean/dry/intact.     Fall precautions in place; bed lowest position, treaded socks on, call light within reach. All needs met at this time. Hourly rounding, will continue to monitor pt.   Aviva Cox R.N.

## 2018-09-03 NOTE — PROGRESS NOTES
Per pt request, RN called pt's mom ([phone]) and gave her an update on pt and that her surgery is currently scheduled for 2pm per MD.  Pt's mom stated she is coming from [place] and will try to be here by lunch time tomorrow and was appreciative of the update.   Aviva Cox R.N.

## 2018-09-03 NOTE — NON-PROVIDER
Internal Medicine Medical Student Note  Note Author: Jacob Roque, Student    Name Kavitha Benson     1975   Age/Sex 43 y.o. female   MRN [id number]   Code Status FULL       Reason for interval visit  (Principal Problem)   Acute osteomyelitis of toe of right foot (HCC)    Interval Problem Daily Status Update  (problem status, last 24 hours, new history, new data )     There has not been any change in the patient's condition since yesterday. The patient is still visibly irritated and not forthcoming about history when asked. She is displaying anxiety about her responsibilities outside of the hospital and is also acting restless and fidgety. She had a urine drug screen performed yesterday that came up positive for amphetamine use which aligns with her admitting to methamphetamine use before admission. Her restlessness may be attributed to acute withdrawal from methamphetamine use. The patient also had labs drawn yesterday which support her being diabetic such as a hemoglobin A1c of 13.2, serum glucose in the 290-300 range, and urine glucose over 1000.    The patient is still suffering from osteomyelitis of her second digit on her right foot secondary to a diabetic foot wound. The microbiology lab identified the bacteria from the patient's wound as staph aureus and a full susceptibility report will be available tomorrow. The patient will be undergoing an amputation of her infected second digit tomorrow performed by Dr. Stevens in order to limit spread of infection. The patient is in stable condition and denies symptoms of systemic infection such as fever, night sweats, chills, or myalgias. She also denies pain in her infected toe.      Physical Exam       Vitals:    18 1526 18 0400 18 0900   BP: 125/73 108/58 122/75 109/52   Pulse: 65 68 64 65   Resp: 16 17 16 14   Temp: 36.2 °C (97.2 °F) 36.3 °C (97.4 °F) 36.4 °C (97.5 °F) 36.7 °C (98.1 °F)   SpO2: 94% 99% 90% 96%      Weight:       Height:         Body mass index is 35.18 kg/m².    Oxygen Therapy:  Pulse Oximetry: 96 %, O2 (LPM): 0, O2 Delivery: None (Room Air)    Physical Exam   Constitutional: She is oriented to person, place, and time and well-developed, well-nourished, and in no distress. Vital signs are normal. No distress.   HENT:   Head: Normocephalic and atraumatic.   Mouth/Throat: Oropharynx is clear and moist. No oropharyngeal exudate.   Eyes: Pupils are equal, round, and reactive to light. Conjunctivae and EOM are normal. Right eye exhibits no discharge. Left eye exhibits no discharge. No scleral icterus.   Neck: Neck supple.   Cardiovascular: Normal rate, regular rhythm, intact distal pulses and normal pulses.  Exam reveals gallop (Patient has S3 gallop on examination).    Murmur (Patient has holosystolic murmur ) heard.   Systolic murmur is present with a grade of 2/6   Pulmonary/Chest: Effort normal and breath sounds normal. No respiratory distress. She has no wheezes. She has no rales. She exhibits no tenderness.   Abdominal: Soft. Bowel sounds are normal. She exhibits no distension and no mass. There is no tenderness. There is no rebound and no guarding.   Musculoskeletal: Normal range of motion. She exhibits no edema.   Neurological: She is alert and oriented to person, place, and time. No cranial nerve deficit.   Skin: Skin is warm and dry. No rash noted. She is not diaphoretic. No erythema. No pallor.   Patient's second digit on her right foot is severely inflamed and erythematous. Currently wrapped in a bandage.   Psychiatric: Her mood appears anxious. She is agitated. She is apathetic.       Assessment/Plan        The patient is a 43 year old female with no significant known past medical history presenting to the emergency department for a severely inflamed and most likely infected second digit of her right foot.    #Osteomyeltitis  The patient has been diagnosed with osteomyelitis in the second digit on  her right foot being caused by gram positive cocci (now identified as staph aureus). The patient is still on IV vancomycin and Unasyn which provide effective coverage for streptococcus and MRSA infections respectively. Once the susceptibility report is received tomorrow from microbiology, then the medications the patient is on can be tailored appropriately. The patient is supposed to undergo surgery tomorrow with Dr. Stevens to have her infected toe amputated in the hopes of limiting spread of infection. After her procedure, the patient may need pain management medication so her pain levels will be evaluated post-operatively.    #Diabetes  The patient was given two doses of sliding scale insulin (insulin regular) in the last 24 hours. One dose was given last night and the other was given this morning for a total of 8 units. She was started on 15 units of Lantus today via injection. Her blood glucose levels are still high in the 290-300 range but they are trending down with the addition of insulin medications. Since the patient has a blood pressure less than 130/90 and her history indicates her being high risk for diabetic nephropathy in the future, she should be started on 5 mg PO Qday of lisinopril following her surgery for future renal protection. After her surgery, she will need to be seen by diabetes education to discuss the specifics of her diagnosis including changes to diet that need to be made.    #Hyponatremia  The patients sodium level on her last CMP was 135 and her glucose level was 294. Therefore her corrected sodium level is 138 mmol/L and she is no longer hyponatremic.     #Substance abuse  The patient reports methamphetamine use utilizing several different routes of administration including intravenous. Her urine drug screen came back positive for amphetamine use.The patient should be counseled on her drug use because use of methamphetamine could put the patient at high risk for cardiovascular  abnormalities, especially with her new onset diabetes.

## 2018-09-04 PROBLEM — E87.6 HYPOKALEMIA: Status: ACTIVE | Noted: 2018-09-04

## 2018-09-04 PROBLEM — D72.829 LEUKOCYTOSIS: Status: RESOLVED | Noted: 2018-09-02 | Resolved: 2018-09-04

## 2018-09-04 PROBLEM — E87.1 HYPONATREMIA: Status: RESOLVED | Noted: 2018-09-02 | Resolved: 2018-09-04

## 2018-09-04 PROBLEM — E83.42 HYPOMAGNESEMIA: Status: ACTIVE | Noted: 2018-09-04

## 2018-09-04 PROBLEM — R74.8 ABNORMAL SERUM LEVEL OF ALKALINE PHOSPHATASE: Status: RESOLVED | Noted: 2018-09-02 | Resolved: 2018-09-04

## 2018-09-04 LAB
ALBUMIN SERPL BCP-MCNC: 2.7 G/DL (ref 3.2–4.9)
ALBUMIN/GLOB SERPL: 0.7 G/DL
ALP SERPL-CCNC: 73 U/L (ref 30–99)
ALT SERPL-CCNC: 7 U/L (ref 2–50)
ANION GAP SERPL CALC-SCNC: 5 MMOL/L (ref 0–11.9)
APTT PPP: 28.8 SEC (ref 24.7–36)
AST SERPL-CCNC: 10 U/L (ref 12–45)
BACTERIA WND AEROBE CULT: ABNORMAL
BACTERIA WND AEROBE CULT: ABNORMAL
BASOPHILS # BLD AUTO: 0.5 % (ref 0–1.8)
BASOPHILS # BLD: 0.04 K/UL (ref 0–0.12)
BILIRUB SERPL-MCNC: 0.3 MG/DL (ref 0.1–1.5)
BUN SERPL-MCNC: 9 MG/DL (ref 8–22)
C PEPTIDE SERPL-MCNC: 1.2 NG/ML (ref 0.8–3.5)
CALCIUM SERPL-MCNC: 9.2 MG/DL (ref 8.5–10.5)
CHLORIDE SERPL-SCNC: 104 MMOL/L (ref 96–112)
CO2 SERPL-SCNC: 28 MMOL/L (ref 20–33)
CREAT SERPL-MCNC: 0.51 MG/DL (ref 0.5–1.4)
EOSINOPHIL # BLD AUTO: 0.21 K/UL (ref 0–0.51)
EOSINOPHIL NFR BLD: 2.5 % (ref 0–6.9)
ERYTHROCYTE [DISTWIDTH] IN BLOOD BY AUTOMATED COUNT: 39.1 FL (ref 35.9–50)
GLOBULIN SER CALC-MCNC: 4 G/DL (ref 1.9–3.5)
GLUCOSE BLD-MCNC: 176 MG/DL (ref 65–99)
GLUCOSE BLD-MCNC: 190 MG/DL (ref 65–99)
GLUCOSE BLD-MCNC: 420 MG/DL (ref 65–99)
GLUCOSE SERPL-MCNC: 227 MG/DL (ref 65–99)
GRAM STN SPEC: ABNORMAL
GRAM STN SPEC: NORMAL
GRAM STN SPEC: NORMAL
HCG SERPL QL: NEGATIVE
HCT VFR BLD AUTO: 38.1 % (ref 37–47)
HGB BLD-MCNC: 12.1 G/DL (ref 12–16)
IMM GRANULOCYTES # BLD AUTO: 0.02 K/UL (ref 0–0.11)
IMM GRANULOCYTES NFR BLD AUTO: 0.2 % (ref 0–0.9)
INR PPP: 1.02 (ref 0.87–1.13)
LYMPHOCYTES # BLD AUTO: 2.51 K/UL (ref 1–4.8)
LYMPHOCYTES NFR BLD: 29.9 % (ref 22–41)
MAGNESIUM SERPL-MCNC: 1.7 MG/DL (ref 1.5–2.5)
MCH RBC QN AUTO: 27.1 PG (ref 27–33)
MCHC RBC AUTO-ENTMCNC: 31.8 G/DL (ref 33.6–35)
MCV RBC AUTO: 85.4 FL (ref 81.4–97.8)
MONOCYTES # BLD AUTO: 0.52 K/UL (ref 0–0.85)
MONOCYTES NFR BLD AUTO: 6.2 % (ref 0–13.4)
NEUTROPHILS # BLD AUTO: 5.09 K/UL (ref 2–7.15)
NEUTROPHILS NFR BLD: 60.7 % (ref 44–72)
NRBC # BLD AUTO: 0 K/UL
NRBC BLD-RTO: 0 /100 WBC
PLATELET # BLD AUTO: 331 K/UL (ref 164–446)
PMV BLD AUTO: 9.5 FL (ref 9–12.9)
POTASSIUM SERPL-SCNC: 3.7 MMOL/L (ref 3.6–5.5)
PROT SERPL-MCNC: 6.7 G/DL (ref 6–8.2)
PROTHROMBIN TIME: 13.1 SEC (ref 12–14.6)
RBC # BLD AUTO: 4.46 M/UL (ref 4.2–5.4)
SIGNIFICANT IND 70042: ABNORMAL
SIGNIFICANT IND 70042: NORMAL
SIGNIFICANT IND 70042: NORMAL
SITE SITE: ABNORMAL
SITE SITE: NORMAL
SITE SITE: NORMAL
SODIUM SERPL-SCNC: 137 MMOL/L (ref 135–145)
SOURCE SOURCE: ABNORMAL
SOURCE SOURCE: NORMAL
SOURCE SOURCE: NORMAL
VANCOMYCIN TROUGH SERPL-MCNC: 22.9 UG/ML (ref 10–20)
WBC # BLD AUTO: 8.4 K/UL (ref 4.8–10.8)

## 2018-09-04 PROCEDURE — 160035 HCHG PACU - 1ST 60 MINS PHASE I: Performed by: ORTHOPAEDIC SURGERY

## 2018-09-04 PROCEDURE — 700111 HCHG RX REV CODE 636 W/ 250 OVERRIDE (IP)

## 2018-09-04 PROCEDURE — 700111 HCHG RX REV CODE 636 W/ 250 OVERRIDE (IP): Performed by: STUDENT IN AN ORGANIZED HEALTH CARE EDUCATION/TRAINING PROGRAM

## 2018-09-04 PROCEDURE — 160039 HCHG SURGERY MINUTES - EA ADDL 1 MIN LEVEL 3: Performed by: ORTHOPAEDIC SURGERY

## 2018-09-04 PROCEDURE — 160028 HCHG SURGERY MINUTES - 1ST 30 MINS LEVEL 3: Performed by: ORTHOPAEDIC SURGERY

## 2018-09-04 PROCEDURE — 99232 SBSQ HOSP IP/OBS MODERATE 35: CPT | Performed by: INTERNAL MEDICINE

## 2018-09-04 PROCEDURE — 87015 SPECIMEN INFECT AGNT CONCNTJ: CPT | Mod: 91

## 2018-09-04 PROCEDURE — 83735 ASSAY OF MAGNESIUM: CPT

## 2018-09-04 PROCEDURE — 500881 HCHG PACK, EXTREMITY: Performed by: ORTHOPAEDIC SURGERY

## 2018-09-04 PROCEDURE — A9270 NON-COVERED ITEM OR SERVICE: HCPCS | Performed by: STUDENT IN AN ORGANIZED HEALTH CARE EDUCATION/TRAINING PROGRAM

## 2018-09-04 PROCEDURE — 80202 ASSAY OF VANCOMYCIN: CPT

## 2018-09-04 PROCEDURE — 160002 HCHG RECOVERY MINUTES (STAT): Performed by: ORTHOPAEDIC SURGERY

## 2018-09-04 PROCEDURE — 87070 CULTURE OTHR SPECIMN AEROBIC: CPT | Mod: 91

## 2018-09-04 PROCEDURE — 36415 COLL VENOUS BLD VENIPUNCTURE: CPT

## 2018-09-04 PROCEDURE — 87077 CULTURE AEROBIC IDENTIFY: CPT | Mod: 91

## 2018-09-04 PROCEDURE — 87075 CULTR BACTERIA EXCEPT BLOOD: CPT | Mod: 91

## 2018-09-04 PROCEDURE — 0Y6R0Z0 DETACHMENT AT RIGHT 2ND TOE, COMPLETE, OPEN APPROACH: ICD-10-PCS | Performed by: ORTHOPAEDIC SURGERY

## 2018-09-04 PROCEDURE — 160009 HCHG ANES TIME/MIN: Performed by: ORTHOPAEDIC SURGERY

## 2018-09-04 PROCEDURE — 84703 CHORIONIC GONADOTROPIN ASSAY: CPT

## 2018-09-04 PROCEDURE — 700102 HCHG RX REV CODE 250 W/ 637 OVERRIDE(OP): Performed by: STUDENT IN AN ORGANIZED HEALTH CARE EDUCATION/TRAINING PROGRAM

## 2018-09-04 PROCEDURE — 160048 HCHG OR STATISTICAL LEVEL 1-5: Performed by: ORTHOPAEDIC SURGERY

## 2018-09-04 PROCEDURE — 82962 GLUCOSE BLOOD TEST: CPT

## 2018-09-04 PROCEDURE — 85730 THROMBOPLASTIN TIME PARTIAL: CPT

## 2018-09-04 PROCEDURE — 501838 HCHG SUTURE GENERAL: Performed by: ORTHOPAEDIC SURGERY

## 2018-09-04 PROCEDURE — 85610 PROTHROMBIN TIME: CPT

## 2018-09-04 PROCEDURE — 87205 SMEAR GRAM STAIN: CPT | Mod: 91

## 2018-09-04 PROCEDURE — 80053 COMPREHEN METABOLIC PANEL: CPT

## 2018-09-04 PROCEDURE — 700101 HCHG RX REV CODE 250

## 2018-09-04 PROCEDURE — 85025 COMPLETE CBC W/AUTO DIFF WBC: CPT

## 2018-09-04 PROCEDURE — 501330 HCHG SET, CYSTO IRRIG TUBING: Performed by: ORTHOPAEDIC SURGERY

## 2018-09-04 PROCEDURE — 770006 HCHG ROOM/CARE - MED/SURG/GYN SEMI*

## 2018-09-04 PROCEDURE — 700105 HCHG RX REV CODE 258: Performed by: STUDENT IN AN ORGANIZED HEALTH CARE EDUCATION/TRAINING PROGRAM

## 2018-09-04 RX ORDER — POTASSIUM CHLORIDE 20 MEQ/1
20 TABLET, EXTENDED RELEASE ORAL ONCE
Status: DISCONTINUED | OUTPATIENT
Start: 2018-09-04 | End: 2018-09-04

## 2018-09-04 RX ORDER — MIDAZOLAM HYDROCHLORIDE 1 MG/ML
INJECTION INTRAMUSCULAR; INTRAVENOUS
Status: DISPENSED
Start: 2018-09-04 | End: 2018-09-04

## 2018-09-04 RX ORDER — BUPIVACAINE HYDROCHLORIDE 5 MG/ML
INJECTION, SOLUTION PERINEURAL
Status: DISCONTINUED | OUTPATIENT
Start: 2018-09-04 | End: 2018-09-04 | Stop reason: HOSPADM

## 2018-09-04 RX ORDER — INSULIN GLARGINE 100 [IU]/ML
20 INJECTION, SOLUTION SUBCUTANEOUS
Status: DISCONTINUED | OUTPATIENT
Start: 2018-09-04 | End: 2018-09-05

## 2018-09-04 RX ORDER — VANCOMYCIN HYDROCHLORIDE 500 MG/10ML
INJECTION, POWDER, LYOPHILIZED, FOR SOLUTION INTRAVENOUS
Status: COMPLETED | OUTPATIENT
Start: 2018-09-04 | End: 2018-09-04

## 2018-09-04 RX ORDER — POTASSIUM CHLORIDE 20 MEQ/1
40 TABLET, EXTENDED RELEASE ORAL ONCE
Status: DISCONTINUED | OUTPATIENT
Start: 2018-09-04 | End: 2018-09-04

## 2018-09-04 RX ORDER — POTASSIUM CHLORIDE 20 MEQ/1
40 TABLET, EXTENDED RELEASE ORAL ONCE
Status: COMPLETED | OUTPATIENT
Start: 2018-09-04 | End: 2018-09-04

## 2018-09-04 RX ADMIN — AMPICILLIN SODIUM AND SULBACTAM SODIUM 3 G: 2; 1 INJECTION, POWDER, FOR SOLUTION INTRAMUSCULAR; INTRAVENOUS at 17:10

## 2018-09-04 RX ADMIN — AMPICILLIN SODIUM AND SULBACTAM SODIUM 3 G: 2; 1 INJECTION, POWDER, FOR SOLUTION INTRAMUSCULAR; INTRAVENOUS at 05:25

## 2018-09-04 RX ADMIN — AMPICILLIN SODIUM AND SULBACTAM SODIUM 3 G: 2; 1 INJECTION, POWDER, FOR SOLUTION INTRAMUSCULAR; INTRAVENOUS at 23:31

## 2018-09-04 RX ADMIN — SENNOSIDES AND DOCUSATE SODIUM 2 TABLET: 8.6; 5 TABLET ORAL at 17:35

## 2018-09-04 RX ADMIN — MAGNESIUM GLUCONATE 500 MG ORAL TABLET 400 MG: 500 TABLET ORAL at 17:35

## 2018-09-04 RX ADMIN — AMPICILLIN SODIUM AND SULBACTAM SODIUM 3 G: 2; 1 INJECTION, POWDER, FOR SOLUTION INTRAMUSCULAR; INTRAVENOUS at 00:54

## 2018-09-04 RX ADMIN — POTASSIUM CHLORIDE 40 MEQ: 1500 TABLET, EXTENDED RELEASE ORAL at 17:34

## 2018-09-04 RX ADMIN — SODIUM CHLORIDE: 9 INJECTION, SOLUTION INTRAVENOUS at 05:25

## 2018-09-04 RX ADMIN — SENNOSIDES AND DOCUSATE SODIUM 2 TABLET: 8.6; 5 TABLET ORAL at 05:25

## 2018-09-04 RX ADMIN — INSULIN GLARGINE 20 UNITS: 100 INJECTION, SOLUTION SUBCUTANEOUS at 17:10

## 2018-09-04 RX ADMIN — INSULIN HUMAN 6 UNITS: 100 INJECTION, SOLUTION PARENTERAL at 20:31

## 2018-09-04 RX ADMIN — INSULIN HUMAN 1 UNITS: 100 INJECTION, SOLUTION PARENTERAL at 05:31

## 2018-09-04 ASSESSMENT — ENCOUNTER SYMPTOMS
HEADACHES: 0
NAUSEA: 0
FEVER: 0
PALPITATIONS: 0
DOUBLE VISION: 0
SHORTNESS OF BREATH: 0
ORTHOPNEA: 0
ABDOMINAL PAIN: 0
TINGLING: 0
DIZZINESS: 0
VOMITING: 0
CHILLS: 0
TREMORS: 0
BLURRED VISION: 0
POLYDIPSIA: 0

## 2018-09-04 ASSESSMENT — PAIN SCALES - GENERAL
PAINLEVEL_OUTOF10: 4
PAINLEVEL_OUTOF10: 0
PAINLEVEL_OUTOF10: 0
PAINLEVEL_OUTOF10: 4
PAINLEVEL_OUTOF10: 4
PAINLEVEL_OUTOF10: 0

## 2018-09-04 NOTE — PROGRESS NOTES
Internal Medicine Interval Note  Note Author: Marquita Aguilar D.O.     Name Kavitha Benson     1975   Age/Sex 43 y.o. female   MRN [id number]   Code Status Full     After 5PM or if no immediate response to page, please call for cross-coverage  Attending/Team: Dr. Clifford/TIMOTHY Huston See Patient List for primary contact information  Call [phone] to page    1st Call - Day Intern (R1):   Dr. Aguilar 2nd Call - Day Sr. Resident (R2/R3):   Dr. Cannon         Reason for interval visit  (Principal Problem)   Osteomyelitis second digit of the right foot     Interval Problem Daily Status Update  (24 hours, problem oriented, brief subjective history, new lab/imaging data pertinent to that problem)   No acute overnight events. Patient has no new complaints this morning. She denies any pain at this time or any symptoms of headaches, vision changes, sweats or abdominal pain.     Osteomyelitis: Patient was being transported to surgery for her second right toe amputation during attending rounds. Wound culture growing Staphylococcus Aureus.     Diabetes: Patient continues to tolerate insulin well. She received 10 units over the past day. POC glucose have been 190-200s.     Review of Systems   Constitutional: Negative for chills and fever.   Eyes: Negative for blurred vision and double vision.   Respiratory: Negative for shortness of breath.    Cardiovascular: Negative for chest pain, palpitations, orthopnea and leg swelling.   Gastrointestinal: Negative for abdominal pain, nausea and vomiting.   Genitourinary: Negative for dysuria, frequency and urgency.   Musculoskeletal: Negative for joint pain.   Neurological: Negative for dizziness, tingling, tremors and headaches.   Endo/Heme/Allergies: Negative for polydipsia.     Disposition/Barriers to discharge:   Inpatient being treated for Osteomyelitis of second digit of right foot. Scheduled for surgery today.     Consultants/Specialty  Orthopedics  Limb Preservation  Service  PCP: Pcp Pt States None    Quality Measures  Quality-Core Measures   Reviewed items::  EKG reviewed, Labs reviewed, Medications reviewed and Radiology images reviewed  Duarte catheter::  No Duarte  DVT prophylaxis pharmacological::  Enoxaparin (Lovenox)      Physical Exam       Vitals:    09/04/18 1232 09/04/18 1245 09/04/18 1300 09/04/18 1315   BP:       Pulse: 64 60 (!) 57 (!) 52   Resp: 18 15 15 15   Temp: 36.7 °C (98 °F)  36.8 °C (98.3 °F)    SpO2: 100% 98% 100% 100%   Weight:       Height:         Body mass index is 35.18 kg/m².    Oxygen Therapy:  Pulse Oximetry: 100 %, O2 (LPM): 2, O2 Delivery: Nasal Cannula    Physical Exam   Constitutional: She is oriented to person, place, and time. No distress.   Laying in bed appearing comfortable.    HENT:   Head: Normocephalic and atraumatic.   Mouth/Throat: Oropharynx is clear and moist. No oropharyngeal exudate.   Eyes: EOM are normal. No scleral icterus.   Cardiovascular: Normal rate, regular rhythm and intact distal pulses.    Murmur (II/IV holosystolic murmur heard best at left sternal border) heard.  Pulmonary/Chest: Effort normal and breath sounds normal. No respiratory distress. She has no wheezes.   Musculoskeletal: She exhibits no edema.   Right second toe wrapped in bandage   Neurological: She is alert and oriented to person, place, and time.   Skin: She is not diaphoretic.   Multiple excoriations on lower extremities   Psychiatric: She is not agitated.   Pleasant affect today.          Assessment/Plan     * Acute osteomyelitis of toe of right foot (HCC)   Assessment & Plan    - Osteomyelitis of second digit of right foot superimposed by cellulitis.   - 9/2/18 Right foot X Ray: Extensive soft tissue swelling with extensive bone destruction involving the second middle and distal phalanges suggesting osteomyelitis with similar findings to a lesser degree involving the third and fourth phalangeal marilin  - complicated with diagnosis of new onset of  "Diabetes  - Wound cx: Staphylococcus Aureus, sensitivities pending  - Blood cx: NGTD   - continue Unasyn and Vancomycin until final sensitivities result  - Limb Preservation service following  - Toe amputation on 9/4/18           Uncontrolled diabetes mellitus (HCC)   Assessment & Plan    - Newly diagnosed with no known prior history of DM  - HbA1C 13.2 on admission   - POCs 190s-200  - 20u Lantus at noon with SSI with meals  - Diabetes education consult  - Will add Lisinopril 5mg 9/5/18        Substance abuse   Assessment & Plan    - methamphetamine (\"speed\") last used on 9/2/18  - UDS + for amphetamines   - Drug abuse counseling   - hx of IVDU  - will screen for Hepatitis C and HIV        Hypomagnesemia   Assessment & Plan    - Oral repletion with MgOxide 400mg BID x 3 doses  - continue to monitor        Hypokalemia   Assessment & Plan    - Oral Repletion as needed   - Continue to monitor          "

## 2018-09-04 NOTE — OR NURSING
Pt to PACU s/p right second toe amputation. Sedate with oral airway in place, removed without issue, maintaining sat on 2L NC. VSS throughout stay, SB-NSR on monitor, Bps WNL. Surgical site WNL, dressing CDI, elevated. Walking boot on pt's bed. Tolerating PO liquids with no c/o nausea. No c/o toe pain, pt uncomfortable lying in bed, says she has some chronic back discomfort, repositioned, pt resting comfortably. Updated pt to POC, emotional support provided. Stable for transfer to room. Report to MAYNOR Chicas.

## 2018-09-04 NOTE — OR SURGEON
Immediate Post OP Note    PreOp Diagnosis: Right second toe osteomyelitis    PostOp Diagnosis: Same    Procedure(s):  TOE AMPUTATION - Wound Class: Dirty or Infected    Surgeon(s):  Wil Stevens M.D.    Anesthesiologist/Type of Anesthesia:  Anesthesiologist: Rebel Maxwell M.D./General    Surgical Staff:  Circulator: Layne Torres R.N.; Roseanna Lew R.N.  Scrub Person: Re Jin    Specimens removed if any: Prox phalanx for culture, psrt of second MTH for clean culture    Estimated Blood Loss: 15cc    TT: 2 min @ 250mmHg    Findings: Purulence within toe, did not track to MTP    Complications: None    Plan: WBAT RLE in postop shoe.  Dressing change POD #2 by LPS.        9/4/2018 12:22 PM Wil Stevens M.D.

## 2018-09-04 NOTE — PROGRESS NOTES
8am RN gave Pre-Op RN report on pt.      Assumed care of pt after receiving report from Night shift RN, discussed plan of care. A&Ox4. RA, VS stable.   Pt states no pain. Medicated per MAR.  Last BM 9/3/18.  No N/V.  Continent of bowel, bladder; having good urine output.    Skin intact; x R 2nd toe and generalized scabbing on extremities.   Fall precautions in place; bed lowest position, treaded socks on, call light within reach. All needs met at this time. Hourly rounding, will continue to monitor pt.   Aviva Cox R.N.

## 2018-09-04 NOTE — NON-PROVIDER
Internal Medicine Medical Student Note  Note Author: Jacob Roque, Student    Name Kavitha Benson     1975   Age/Sex 43 y.o. female   MRN [id number]   Code Status FULL       Reason for interval visit  (Principal Problem)   Acute osteomyelitis of toe of right foot (HCC)    Interval Problem Daily Status Update  (problem status, last 24 hours, new history, new data )     The patient's condition has not changed much from yesterday. When asked questions about history or current symptoms this morning, patient had a flat affect and responded only with yes or no answers. Her glucose level is improving and her last measured glucose before surgery was 176 mg/dL. Before surgery she had received 10 units of sliding scale insulin (insulin regular) and was on 15 units of Lantus. She had surgery this morning to have her second digit on her right foot amputated which went well without any complications. The wound on her foot was cultured before being dressed and the results are pending. She is doing well after surgery and denies experiencing any fever, chills, night sweats, or myalgias.      Physical Exam       Vitals:    18 1415 18 1445 18 1515 18 1545   BP: 133/83 129/74 122/60 124/63   Pulse: 60 60 74 72   Resp: 16 16 16 17   Temp: 36.9 °C (98.4 °F) 36.8 °C (98.3 °F) 36.8 °C (98.2 °F) 36.4 °C (97.6 °F)   SpO2: 98% 98% 93% 96%   Weight:       Height:         Body mass index is 35.18 kg/m².    Oxygen Therapy:  Pulse Oximetry: 96 %, O2 (LPM): 0, O2 Delivery: None (Room Air)    Physical Exam    Constitutional: She is oriented to person, place, and time and well-developed, well-nourished, and in no distress. Vital signs are normal. No distress.   HENT:   Head: Normocephalic and atraumatic.   Mouth/Throat: Oropharynx is clear and moist. No oropharyngeal exudate.   Eyes: Pupils are equal, round, and reactive to light. Conjunctivae and EOM are normal. Right eye exhibits no discharge. Left eye  exhibits no discharge. No scleral icterus.   Neck: Neck supple.   Cardiovascular: Normal rate, regular rhythm, intact distal pulses and normal pulses.  Exam reveals gallop (Patient has S3 gallop on examination).    Murmur (Patient has grade II/VI holosystolic murmur heard best at the left upper sternal border) heard.  Pulmonary/Chest: Effort normal and breath sounds normal. No respiratory distress. She has no wheezes. She has no rales. She exhibits no tenderness.   Abdominal: Soft. Bowel sounds are normal. She exhibits no distension and no mass. There is no tenderness. There is no rebound and no guarding.   Musculoskeletal: Normal range of motion. She exhibits no edema. Patient has amputation of second digit of right foot (currently dressed).  Neurological: She is alert and oriented to person, place, and time. No cranial nerve deficit.   Skin: Skin is warm and dry. No rash noted. She is not diaphoretic. No erythema. No pallor.   Psychiatric: Her mood appears anxious. She is agitated. She is apathetic.       Assessment/Plan     The patient is a 43 year old female with no significant known past medical history presenting to the emergency department for a severely inflamed and most likely infected second digit of her right foot. Currently status post second digit amputation.    #Osteomyeltitis  The patient was diagnosed with osteomyelitis in the second digit on her right foot caused by gram positive cocci (now identified as methicillin sensitive staph aureus). She is currently status post amputation of the affected digit. She was on IV vancomycin and Unasyn, but since the sensitivity report indicated MSSA sensitive to Unasyn, her vancomycin will be discontinued and she will be kept on Unasyn. Patient's wound was cultured prior to being dressed post-operatively and the results are currently pending. If the results come back positive for infection, then her antibiotic regimen will be modified appropriately at that  time.    #Diabetes  The patient was given 10 units of sliding scale insulin (insulin regular) in the last 24 hours. She was started on 15 units of Lantus yesterday via injection but her doage has been increased to 20 units post-surgery. Her blood glucose levels are still high in the 170-180 range but they are trending down with the addition of insulin medications. Since the patient has a blood pressure less than 130/90 and her history indicates her being high risk for diabetic nephropathy in the future, she will be started on 5 mg PO Qday of lisinopril tomorrow for future renal protection. She was seen by diabetes education today to discuss the specifics of her diagnosis including changes to diet that need to be made.    #Substance abuse  The patient reports methamphetamine use utilizing several different routes of administration including intravenous. The patient has had an HIV screen, and a hepatitis screen ordered today and the results are pending. The patient should be counseled on her drug use because use of methamphetamine could put the patient at high risk for cardiovascular abnormalities, especially with her new onset diabetes.

## 2018-09-04 NOTE — OP REPORT
DATE OF SERVICE:  09/04/2018    PREOPERATIVE DIAGNOSIS:  Right second toe osteomyelitis with ulceration.    POSTOPERATIVE DIAGNOSES:  Right second toe osteomyelitis with ulceration.    PROCEDURE:  Right second toe amputation.    SURGEON:  Wil Stevens MD    ASSISTANT:  VERITO Butler    ANESTHESIA:  General with 10 mL local 0.5% Marcaine without epinephrine.    ESTIMATED BLOOD LOSS:  15 mL.    TOURNIQUET TIME:  2 minutes at 250 mmHg.    SPECIMENS:  Toe and proximal phalanx sent for culture, part of metatarsal head   sent for clean culture.    FINDINGS:  Adequate blood flow to flaps.  No tracking purulence beyond the   level of amputation.    COMPLICATIONS:  None.    OUTCOME:  To PACU in stable condition.    HISTORY OF PRESENT ILLNESS:  The patient is a 43-year-old diabetic female who   has a nonhealing ulceration of right second toe that has become significantly   enlarged and with ulceration tracking to bone.  She was indicated for the   above stated procedure.  She was greeted in the preoperative holding area and   identified by name and medical record number.  The right lower extremity was   marked.  Risks of the procedure including bleeding, infection, pain, wound   breakdown, and need for more surgery were discussed and she provided a written   consent.    DESCRIPTION OF PROCEDURE:  She was taken to the operating room and placed on   the table in supine position.  Preoperative antibiotics were administered and   general anesthesia induced.  An operative pause was undertaken, where all   present were in agreement with patient identification, laterality, and   procedure to be performed.  The limb was elevated for 5 minutes, tourniquet   raised to 250 mmHg.  We excised the toe through the proximal phalanx at the   most distal level of healthy skin.  The toe was disarticulated at the proximal   interphalangeal joint and passed off.  We made a longitudinal incision   proximally to the  metatarsophalangeal joint and disarticulated the toe there.    We then copiously irrigated with sterile normal saline using forceps and   scalpel to remove tendons and additional nonviable tissue.  We used a clean   rongeur and forcep to take a sample of bone from the metatarsal head.  We   placed 500 mg of vancomycin powder within the wound bed.  We then closed it   with 2-0 nylon in horizontal mattress fashion.  Xeroform gauze and a   compressive wrap were placed.  The patient was awakened and extubated in   stable condition.    POSTOPERATIVE COURSE:  She will be weightbearing as tolerated to the right   lower extremity in a postoperative shoe.  She will continue to be followed by   limb preservation, orthopedics and infectious disease.  I appreciate their   assistance in management.       ____________________________________     MD ALAINA GAMBINO / ELADIA    DD:  09/04/2018 12:28:13  DT:  09/04/2018 12:48:16    D#:  [id number]  Job#:  [id number]

## 2018-09-04 NOTE — CONSULTS
"9/4/2018    Kavitha Benson is a 43 y.o. female admitted for nonhealing ulcer and worsening swelling right second toe.      Past Medical History:   Diagnosis Date   • Diabetes (HCC) 09/2018    Type II, new onset       History reviewed. No pertinent surgical history.    Medications  No current facility-administered medications on file prior to encounter.      No current outpatient prescriptions on file prior to encounter.       Allergies  Patient has no known allergies.    ROS   All other systems were reviewed and found to be negative    Family History   Problem Relation Age of Onset   • Stroke Neg Hx    • Heart Disease Neg Hx        Social History     Social History   • Marital status: Single     Spouse name: N/A   • Number of children: N/A   • Years of education: N/A     Social History Main Topics   • Smoking status: Never Smoker   • Smokeless tobacco: Never Used   • Alcohol use No   • Drug use: Yes      Comment: \"smokes speed\"; last meth use 9/2/18   • Sexual activity: Not on file     Other Topics Concern   • Not on file     Social History Narrative   • No narrative on file       Physical Exam  Vitals  Blood pressure 142/77, pulse 65, temperature 37.5 °C (99.5 °F), resp. rate 18, height 1.702 m (5' 7\"), weight 101.9 kg (224 lb 10.4 oz), SpO2 92 %, not currently breastfeeding.  General: Well Developed, Well Nourished, no acute distress  RLE: Palp DP pulse.  Circ swelling and erythema second toe.  Dec sens stocking dist to above ankle.    Radiographs:  DX-FOOT-COMPLETE 3+ RIGHT   Final Result      Extensive soft tissue swelling with extensive bone destruction involving the second middle and distal phalanges suggesting osteomyelitis with a destructive neoplastic process seeming less likely.      Similar findings are also noted to a lesser degree involving the third and fourth phalangeal marilin.          Laboratory Values  Recent Labs      09/02/18   0821  09/03/18   0340  09/04/18   0400   WBC  12.6*  8.3  8.4 "   RBC  4.87  4.41  4.46   HEMOGLOBIN  13.6  12.5  12.1   HEMATOCRIT  41.4  37.8  38.1   MCV  85.0  85.7  85.4   MCH  27.9  28.3  27.1   MCHC  32.9*  33.1*  31.8*   RDW  37.9  38.6  39.1   PLATELETCT  400  316  331   MPV  9.6  9.7  9.5     Recent Labs      09/02/18   0821  09/03/18   0340  09/04/18   0400   SODIUM  130*  135  137   POTASSIUM  3.9  3.9  3.7   CHLORIDE  93*  100  104   CO2  32  30  28   GLUCOSE  431*  294*  227*   BUN  13  10  9     Recent Labs      09/04/18   0400   APTT  28.8   INR  1.02         Impression: Right second toe swelling and ulceration    Plan:    NPO for right second toe amputation today  WBAT postoperatively  Will send cultures  DM education    Wil Stevens MD

## 2018-09-04 NOTE — ASSESSMENT & PLAN NOTE
"- methamphetamine (\"speed\") last used on 9/2/18  - UDS + for amphetamines   - Drug abuse counseling   - hx of IVDU  - negative screening for Hepatitis C and HIV  "
- Newly diagnosed with no known prior history of DM  - HbA1C 13.2 on admission   - Elevated blood sugars secondary to stress response from surgery 9/4/18  - SW assisting to get insulin and other supplies for patient. Should be ready by tomorrow morning.   - Continue lisinopril 5 mg  - Plan for discharge tomorrow.   - Will need to follow-up with a primary care provider post-dischrage  
- Oral Repletion as needed   - Continue to monitor  
- Osteomyelitis of second digit of right foot superimposed by cellulitis s/p amputation 9/4/18- complicated with diagnosis of new onset of Diabetes  - Wound cx: Methicillin sensitive Staphylococcus Aureus  - Blood cx: No growth  - continue Unasyn and discontinue on 9/71/18 (last dose today)  - OR cultures from second metatarsal joint have been negative so far  - Limb Preservation service following  - Plan for discharge tomorrow morning    
- Replete as needed  - continue to monitor  
- present on admission secondary to osteomyelitis   - now WNL, will continue to monitor   
- secondary to osteomyelitis   - WBC 12.6 on admission   - WBC now WNL   - Resolved, will continue to monitor   
-Pseudohyponatremia due to hyperglycemic state on admission   - Corrected for hyperglycemia, sodium is 138, within normal range  - now resolved   
Yes

## 2018-09-04 NOTE — CONSULTS
Diabetes education: Kavitha is newly dx with diabetes admitted with blood sugar of 431 and Hg a1c of 13.1%. Pt had surgery this am and returned to her room at 1:10 post surgery.   Pt is currently getting Lantus 20 units starting today with Regular insulin sliding scale ac and hs. Current blood sugars are : 260 (3 units), 190 (1 unit) and no further since surgery.  Pt has PFA pending so not sure of insurance coverage. Pt lives in [place] so not sure what was available for PCP ( [facility]). Coverage would determine whether she should go home on Lantus vs NPH and regular.  Plan: CDE to follow up tomorrow to begin education to include finger sticks and insulin administration. Is patient to going to need post hospital care or what? Please call [phone] when plans known.

## 2018-09-05 LAB
ANION GAP SERPL CALC-SCNC: 4 MMOL/L (ref 0–11.9)
BUN SERPL-MCNC: 11 MG/DL (ref 8–22)
CALCIUM SERPL-MCNC: 8.4 MG/DL (ref 8.5–10.5)
CHLORIDE SERPL-SCNC: 100 MMOL/L (ref 96–112)
CO2 SERPL-SCNC: 31 MMOL/L (ref 20–33)
CREAT SERPL-MCNC: 0.68 MG/DL (ref 0.5–1.4)
ERYTHROCYTE [DISTWIDTH] IN BLOOD BY AUTOMATED COUNT: 38.5 FL (ref 35.9–50)
GLUCOSE BLD-MCNC: 198 MG/DL (ref 65–99)
GLUCOSE BLD-MCNC: 251 MG/DL (ref 65–99)
GLUCOSE BLD-MCNC: 256 MG/DL (ref 65–99)
GLUCOSE BLD-MCNC: 300 MG/DL (ref 65–99)
GLUCOSE SERPL-MCNC: 284 MG/DL (ref 65–99)
HAV IGM SERPL QL IA: NEGATIVE
HBV CORE IGM SER QL: NEGATIVE
HBV SURFACE AG SER QL: NEGATIVE
HCT VFR BLD AUTO: 38.2 % (ref 37–47)
HCV AB SER QL: NEGATIVE
HGB BLD-MCNC: 12.5 G/DL (ref 12–16)
HIV 1+2 AB+HIV1 P24 AG SERPL QL IA: NON REACTIVE
MCH RBC QN AUTO: 27.9 PG (ref 27–33)
MCHC RBC AUTO-ENTMCNC: 32.7 G/DL (ref 33.6–35)
MCV RBC AUTO: 85.3 FL (ref 81.4–97.8)
PLATELET # BLD AUTO: 320 K/UL (ref 164–446)
PMV BLD AUTO: 9.4 FL (ref 9–12.9)
POTASSIUM SERPL-SCNC: 4 MMOL/L (ref 3.6–5.5)
RBC # BLD AUTO: 4.48 M/UL (ref 4.2–5.4)
SODIUM SERPL-SCNC: 135 MMOL/L (ref 135–145)
WBC # BLD AUTO: 11.8 K/UL (ref 4.8–10.8)

## 2018-09-05 PROCEDURE — A9270 NON-COVERED ITEM OR SERVICE: HCPCS | Performed by: STUDENT IN AN ORGANIZED HEALTH CARE EDUCATION/TRAINING PROGRAM

## 2018-09-05 PROCEDURE — 700111 HCHG RX REV CODE 636 W/ 250 OVERRIDE (IP): Performed by: STUDENT IN AN ORGANIZED HEALTH CARE EDUCATION/TRAINING PROGRAM

## 2018-09-05 PROCEDURE — 85027 COMPLETE CBC AUTOMATED: CPT

## 2018-09-05 PROCEDURE — 700102 HCHG RX REV CODE 250 W/ 637 OVERRIDE(OP): Performed by: STUDENT IN AN ORGANIZED HEALTH CARE EDUCATION/TRAINING PROGRAM

## 2018-09-05 PROCEDURE — 770006 HCHG ROOM/CARE - MED/SURG/GYN SEMI*

## 2018-09-05 PROCEDURE — 99232 SBSQ HOSP IP/OBS MODERATE 35: CPT | Performed by: INTERNAL MEDICINE

## 2018-09-05 PROCEDURE — 700105 HCHG RX REV CODE 258: Performed by: STUDENT IN AN ORGANIZED HEALTH CARE EDUCATION/TRAINING PROGRAM

## 2018-09-05 PROCEDURE — 80074 ACUTE HEPATITIS PANEL: CPT

## 2018-09-05 PROCEDURE — 82962 GLUCOSE BLOOD TEST: CPT | Mod: 91

## 2018-09-05 PROCEDURE — 80048 BASIC METABOLIC PNL TOTAL CA: CPT

## 2018-09-05 PROCEDURE — 36415 COLL VENOUS BLD VENIPUNCTURE: CPT

## 2018-09-05 PROCEDURE — 87389 HIV-1 AG W/HIV-1&-2 AB AG IA: CPT

## 2018-09-05 RX ORDER — LISINOPRIL 2.5 MG/1
5 TABLET ORAL
Status: DISCONTINUED | OUTPATIENT
Start: 2018-09-05 | End: 2018-09-08 | Stop reason: HOSPADM

## 2018-09-05 RX ORDER — DEXTROSE MONOHYDRATE 25 G/50ML
25 INJECTION, SOLUTION INTRAVENOUS
Status: DISCONTINUED | OUTPATIENT
Start: 2018-09-05 | End: 2018-09-06

## 2018-09-05 RX ORDER — MAGNESIUM SULFATE 1 G/100ML
1 INJECTION INTRAVENOUS ONCE
Status: DISCONTINUED | OUTPATIENT
Start: 2018-09-05 | End: 2018-09-05

## 2018-09-05 RX ORDER — INSULIN GLARGINE 100 [IU]/ML
0.2 INJECTION, SOLUTION SUBCUTANEOUS EVERY EVENING
Status: DISCONTINUED | OUTPATIENT
Start: 2018-09-05 | End: 2018-09-06

## 2018-09-05 RX ADMIN — INSULIN GLARGINE 20 UNITS: 100 INJECTION, SOLUTION SUBCUTANEOUS at 12:24

## 2018-09-05 RX ADMIN — MAGNESIUM GLUCONATE 500 MG ORAL TABLET 400 MG: 500 TABLET ORAL at 06:00

## 2018-09-05 RX ADMIN — INSULIN LISPRO 5 UNITS: 100 INJECTION, SOLUTION INTRAVENOUS; SUBCUTANEOUS at 17:34

## 2018-09-05 RX ADMIN — INSULIN LISPRO 2 UNITS: 100 INJECTION, SOLUTION INTRAVENOUS; SUBCUTANEOUS at 20:22

## 2018-09-05 RX ADMIN — SODIUM CHLORIDE: 9 INJECTION, SOLUTION INTRAVENOUS at 05:30

## 2018-09-05 RX ADMIN — AMPICILLIN SODIUM AND SULBACTAM SODIUM 3 G: 2; 1 INJECTION, POWDER, FOR SOLUTION INTRAMUSCULAR; INTRAVENOUS at 17:37

## 2018-09-05 RX ADMIN — ENOXAPARIN SODIUM 40 MG: 100 INJECTION SUBCUTANEOUS at 12:32

## 2018-09-05 RX ADMIN — AMPICILLIN SODIUM AND SULBACTAM SODIUM 3 G: 2; 1 INJECTION, POWDER, FOR SOLUTION INTRAMUSCULAR; INTRAVENOUS at 05:25

## 2018-09-05 RX ADMIN — INSULIN LISPRO 2 UNITS: 100 INJECTION, SOLUTION INTRAVENOUS; SUBCUTANEOUS at 17:35

## 2018-09-05 RX ADMIN — AMPICILLIN SODIUM AND SULBACTAM SODIUM 3 G: 2; 1 INJECTION, POWDER, FOR SOLUTION INTRAMUSCULAR; INTRAVENOUS at 12:33

## 2018-09-05 RX ADMIN — INSULIN HUMAN 3 UNITS: 100 INJECTION, SOLUTION PARENTERAL at 05:26

## 2018-09-05 RX ADMIN — INSULIN HUMAN 3 UNITS: 100 INJECTION, SOLUTION PARENTERAL at 12:24

## 2018-09-05 ASSESSMENT — ENCOUNTER SYMPTOMS
FEVER: 0
HEADACHES: 0
CHILLS: 0
TREMORS: 0
VOMITING: 0
PALPITATIONS: 0
TINGLING: 0
NAUSEA: 0
BLURRED VISION: 0
DOUBLE VISION: 0
POLYDIPSIA: 0
SHORTNESS OF BREATH: 0
ABDOMINAL PAIN: 0
ORTHOPNEA: 0
DIZZINESS: 0

## 2018-09-05 NOTE — PROGRESS NOTES
Internal Medicine Interval Note  Note Author: Marquita Aguilar D.O.     Name Kavitha Benson     1975   Age/Sex 43 y.o. female   MRN [id number]   Code Status Full     After 5PM or if no immediate response to page, please call for cross-coverage  Attending/Team: Dr. Clifford/TIMOTHY Huston See Patient List for primary contact information  Call [phone] to page    1st Call - Day Intern (R1):   Dr. Aguilar 2nd Call - Day Sr. Resident (R2/R3):   Dr. Cannon         Reason for interval visit  (Principal Problem)   Osteomyelitis second digit of the right foot     Interval Problem Daily Status Update  (24 hours, problem oriented, brief subjective history, new lab/imaging data pertinent to that problem)   No acute overnight events. Patient tolerated surgery well and has no new concerns at this time. Denies pain at this time    Osteomyelitis: Wound culture growing Staphylococcus Aureus. Discontinued Vancomycin 18.     Diabetes: Patient continues to tolerate insulin well. She received 10 units over the past day. POC glucose have been 170-420s. Patient states she has difficulty swallowing pills.     Review of Systems   Constitutional: Negative for chills and fever.   Eyes: Negative for blurred vision and double vision.   Respiratory: Negative for shortness of breath.    Cardiovascular: Negative for chest pain, palpitations, orthopnea and leg swelling.   Gastrointestinal: Negative for abdominal pain, nausea and vomiting.   Genitourinary: Negative for dysuria, frequency and urgency.   Musculoskeletal: Negative for joint pain.   Neurological: Negative for dizziness, tingling, tremors and headaches.   Endo/Heme/Allergies: Negative for polydipsia.     Disposition/Barriers to discharge:   Inpatient being treated for Osteomyelitis of second digit of right foot.      Consultants/Specialty  Orthopedics  Limb Preservation Service  PCP: Pcp Pt States None    Quality Measures  Quality-Core Measures   Reviewed items::  EKG  reviewed, Labs reviewed, Medications reviewed and Radiology images reviewed  Duarte catheter::  No Duarte  DVT prophylaxis pharmacological::  Enoxaparin (Lovenox)      Physical Exam       Vitals:    09/04/18 2000 09/05/18 0530 09/05/18 0800 09/05/18 1600   BP: 113/63 122/64 102/56 106/72   Pulse: 65 63 65 (!) 56   Resp: 16 18 17 16   Temp: 36.6 °C (97.8 °F) 36.7 °C (98.1 °F) 36.3 °C (97.4 °F) 36.2 °C (97.1 °F)   SpO2: 96% 98% 97% 97%   Weight:       Height:         Body mass index is 35.18 kg/m².    Oxygen Therapy:  Pulse Oximetry: 97 %, O2 (LPM): 2, O2 Delivery: Nasal Cannula    Physical Exam   Constitutional: She is oriented to person, place, and time. No distress.   Laying in bed appearing comfortable.    HENT:   Head: Normocephalic and atraumatic.   Mouth/Throat: Oropharynx is clear and moist. No oropharyngeal exudate.   Poor oral hygiene   Eyes: EOM are normal. No scleral icterus.   Cardiovascular: Normal rate, regular rhythm and intact distal pulses.    Murmur (II/IV holosystolic murmur heard best at left sternal border) heard.  Pulmonary/Chest: Effort normal and breath sounds normal. No respiratory distress. She has no wheezes.   Musculoskeletal: She exhibits no edema.   Right second toe wrapped in bandage   Neurological: She is alert and oriented to person, place, and time.   Moving all extremities spontaneously   Skin: She is not diaphoretic.   Multiple excoriations on lower extremities   Psychiatric: She is not agitated.   Pleasant affect today.          Assessment/Plan     * Acute osteomyelitis of toe of right foot (HCC)   Assessment & Plan    - Osteomyelitis of second digit of right foot superimposed by cellulitis s/p amputation 9/4/18- complicated with diagnosis of new onset of Diabetes  - Wound cx: Methicillin sensitive Staphylococcus Aureus  - Blood cx: NGTD   - continue Unasyn   - OR cultures so far have been negative  - Limb Preservation service following          Uncontrolled diabetes mellitus (HCC)   "  Assessment & Plan    - Newly diagnosed with no known prior history of DM  - HbA1C 13.2 on admission   - POCs 170s-240s  - Elevated blood sugars secondary to stress response from surgery  - 20u Lantus at noon with SSI with meals  - 2u scheduled Novolog with meals   - Diabetes education consult  - Added Lisinopril 5mg         Substance abuse   Assessment & Plan    - methamphetamine (\"speed\") last used on 9/2/18  - UDS + for amphetamines   - Drug abuse counseling   - hx of IVDU  - negative screening for Hepatitis C and HIV        Hypomagnesemia   Assessment & Plan    - Patient states she is unable to tolerate pills due to difficulty swallowing   - IV repletion  - continue to monitor        Hypokalemia   Assessment & Plan    - Oral Repletion as needed   - Continue to monitor          "

## 2018-09-05 NOTE — PROGRESS NOTES
Diabetes education:    Visited with patient. Patient was unhappy we woke her up but requested that we stay so as not to wake her up again. Pt has familial history of diabetes. Patient instructed on use of drawing insulin out of a syringe and injecting insulin, pt successfully demonstrated ability to give insulin injection. Pt states she is absolutely unable to take pills thus insulin will be her therapy to control her blood sugars.    Plan:  Revisit patient tomorrow to instruct on the use of a glucometer and to assess today's teaching for efficacy.

## 2018-09-05 NOTE — NON-PROVIDER
Internal Medicine Medical Student Note  Note Author: Jacob Roque, Student    Name Kavitha Benson     1975   Age/Sex 43 y.o. female   MRN [id number]   Code Status FULL       Reason for interval visit  (Principal Problem)   Acute osteomyelitis of toe of right foot (HCC)    Interval Problem Daily Status Update  (problem status, last 24 hours, new history, new data )     The patient appears to be recovering well post-surgery. Her mood seems much better today and her affect is normal when asking her questions about her history. The initial culture results from her intra-operative wound culture show no organisms. Last night at 8 pm the patient's glucose jumped to 420. The patient's glucose this morning at 11 am was 300. Her Lantus was increased to 20 units yesterday but her sliding scale has not been adjusted yet. The results of her HIV, Hep B, and Hep C tests were all negative. She denies any symptoms of infection such as fever, chills, night sweats, sore throat, and myalgias. She denies any pain associated with her amputation.       Physical Exam       Vitals:    18 1545 18 2000 18 0530 18 0800   BP: 124/63 113/63 122/64 102/56   Pulse: 72 65 63 65   Resp: 17 16 18 17   Temp: 36.4 °C (97.6 °F) 36.6 °C (97.8 °F) 36.7 °C (98.1 °F) 36.3 °C (97.4 °F)   SpO2: 96% 96% 98% 97%   Weight:       Height:         Body mass index is 35.18 kg/m².    Oxygen Therapy:  Pulse Oximetry: 97 %, O2 (LPM): 2, O2 Delivery: Nasal Cannula    Physical Exam  Constitutional: She is oriented to person, place, and time and well-developed, well-nourished, and in no distress. Vital signs are normal. No distress.   Head: Normocephalic and atraumatic.   Mouth/Throat: Oropharynx is clear and moist. No oropharyngeal exudate.   Eyes: Pupils are equal, round, and reactive to light. Conjunctivae and EOM are normal. Right eye exhibits no discharge. Left eye exhibits no discharge. No scleral icterus.   Neck: Neck  supple.   Cardiovascular: Normal rate, regular rhythm, intact distal pulses and normal pulses.  Exam reveals gallop (Patient has S3 gallop on examination).    Murmur (Patient has grade II/VI holosystolic murmur heard best at the left upper sternal border) heard.  Pulmonary/Chest: Effort normal and breath sounds normal. No respiratory distress. She has no wheezes. She has no rales. She exhibits no tenderness.   Abdominal: Soft. Bowel sounds are normal. She exhibits no distension and no mass. There is no tenderness. There is no rebound and no guarding.   Musculoskeletal: Normal range of motion. She exhibits no edema. Patient has amputation of second digit of right foot (currently dressed).  Neurological: She is alert and oriented to person, place, and time. No cranial nerve deficit.   Skin: Skin is warm and dry. No rash noted. She is not diaphoretic. No erythema. No pallor.   Psychiatric: Her mood appears normal.      Assessment/Plan     The patient is a 43 year old female with no significant known past medical history presenting to the emergency department for a severely inflamed and most likely infected second digit of her right foot. Currently status post second digit amputation.    #Diabetes  The patient was given 12 units of sliding scale insulin (insulin regular) in the last 24 hours. She is currently on 20 units of Lantus post-surgery. Her blood glucose level rebekah in the last 24 hours and is now 300. She has been placed on 2 units of insulin lispro for use TID before meals and she has been put on moderate intensity insulin lispro sliding scale. Since the patient has a blood pressure less than 130/90 and her history indicates her being high risk for diabetic nephropathy in the future, she has been started on 5 mg PO Qday of lisinopril today for future renal protection. The patient was seen by diabetes education yesterday to show her how to inject the insulin she will be using after  discharge.    #Osteomyeltitis  The patient was diagnosed with osteomyelitis in the second digit on her right foot caused by gram positive cocci (now identified as methicillin sensitive staph aureus). She is currently status post amputation of the affected digit. She was on IV vancomycin and Unasyn, but since the sensitivity report indicated MSSA sensitive to Unasyn, her vancomycin will be discontinued and she will be kept on Unasyn. Patient's wound was cultured prior to being dressed post-operatively and the full results are currently pending. If the results come back positive for infection, then her antibiotic regimen will be modified appropriately at that time. Once the presence of an infection has been ruled out and the patient shows enough clinical improvement, she will likely be ready for discharge.    #Substance abuse  The patient reports methamphetamine use utilizing several different routes of administration including intravenous. The patient has had an HIV screen, and a hepatitis B and C screen ordered yesterday and the results were negative. The patient should be counseled on her drug use because use of methamphetamine could put the patient at high risk for cardiovascular abnormalities, especially with her new onset diabetes.

## 2018-09-06 LAB
ANION GAP SERPL CALC-SCNC: 2 MMOL/L (ref 0–11.9)
BACTERIA TISS AEROBE CULT: ABNORMAL
BASOPHILS # BLD AUTO: 0.3 % (ref 0–1.8)
BASOPHILS # BLD: 0.03 K/UL (ref 0–0.12)
BUN SERPL-MCNC: 13 MG/DL (ref 8–22)
CALCIUM SERPL-MCNC: 8.8 MG/DL (ref 8.5–10.5)
CHLORIDE SERPL-SCNC: 102 MMOL/L (ref 96–112)
CO2 SERPL-SCNC: 32 MMOL/L (ref 20–33)
CREAT SERPL-MCNC: 0.7 MG/DL (ref 0.5–1.4)
EOSINOPHIL # BLD AUTO: 0.2 K/UL (ref 0–0.51)
EOSINOPHIL NFR BLD: 1.8 % (ref 0–6.9)
ERYTHROCYTE [DISTWIDTH] IN BLOOD BY AUTOMATED COUNT: 38.8 FL (ref 35.9–50)
GLUCOSE BLD-MCNC: 175 MG/DL (ref 65–99)
GLUCOSE BLD-MCNC: 215 MG/DL (ref 65–99)
GLUCOSE BLD-MCNC: 224 MG/DL (ref 65–99)
GLUCOSE BLD-MCNC: 255 MG/DL (ref 65–99)
GLUCOSE SERPL-MCNC: 242 MG/DL (ref 65–99)
GRAM STN SPEC: ABNORMAL
GRAM STN SPEC: ABNORMAL
HCT VFR BLD AUTO: 37.6 % (ref 37–47)
HGB BLD-MCNC: 12.4 G/DL (ref 12–16)
IMM GRANULOCYTES # BLD AUTO: 0.05 K/UL (ref 0–0.11)
IMM GRANULOCYTES NFR BLD AUTO: 0.4 % (ref 0–0.9)
LYMPHOCYTES # BLD AUTO: 3.05 K/UL (ref 1–4.8)
LYMPHOCYTES NFR BLD: 27.1 % (ref 22–41)
MAGNESIUM SERPL-MCNC: 1.8 MG/DL (ref 1.5–2.5)
MCH RBC QN AUTO: 28.2 PG (ref 27–33)
MCHC RBC AUTO-ENTMCNC: 33 G/DL (ref 33.6–35)
MCV RBC AUTO: 85.5 FL (ref 81.4–97.8)
MONOCYTES # BLD AUTO: 0.67 K/UL (ref 0–0.85)
MONOCYTES NFR BLD AUTO: 5.9 % (ref 0–13.4)
NEUTROPHILS # BLD AUTO: 7.27 K/UL (ref 2–7.15)
NEUTROPHILS NFR BLD: 64.5 % (ref 44–72)
NRBC # BLD AUTO: 0 K/UL
NRBC BLD-RTO: 0 /100 WBC
PLATELET # BLD AUTO: 301 K/UL (ref 164–446)
PMV BLD AUTO: 9.4 FL (ref 9–12.9)
POTASSIUM SERPL-SCNC: 3.9 MMOL/L (ref 3.6–5.5)
RBC # BLD AUTO: 4.4 M/UL (ref 4.2–5.4)
SIGNIFICANT IND 70042: ABNORMAL
SIGNIFICANT IND 70042: ABNORMAL
SITE SITE: ABNORMAL
SITE SITE: ABNORMAL
SODIUM SERPL-SCNC: 136 MMOL/L (ref 135–145)
SOURCE SOURCE: ABNORMAL
SOURCE SOURCE: ABNORMAL
WBC # BLD AUTO: 11.3 K/UL (ref 4.8–10.8)

## 2018-09-06 PROCEDURE — 82962 GLUCOSE BLOOD TEST: CPT | Mod: 91

## 2018-09-06 PROCEDURE — 83735 ASSAY OF MAGNESIUM: CPT

## 2018-09-06 PROCEDURE — A9270 NON-COVERED ITEM OR SERVICE: HCPCS | Performed by: STUDENT IN AN ORGANIZED HEALTH CARE EDUCATION/TRAINING PROGRAM

## 2018-09-06 PROCEDURE — 770006 HCHG ROOM/CARE - MED/SURG/GYN SEMI*

## 2018-09-06 PROCEDURE — 700105 HCHG RX REV CODE 258: Performed by: STUDENT IN AN ORGANIZED HEALTH CARE EDUCATION/TRAINING PROGRAM

## 2018-09-06 PROCEDURE — 700102 HCHG RX REV CODE 250 W/ 637 OVERRIDE(OP): Performed by: STUDENT IN AN ORGANIZED HEALTH CARE EDUCATION/TRAINING PROGRAM

## 2018-09-06 PROCEDURE — 99232 SBSQ HOSP IP/OBS MODERATE 35: CPT | Performed by: INTERNAL MEDICINE

## 2018-09-06 PROCEDURE — 80048 BASIC METABOLIC PNL TOTAL CA: CPT

## 2018-09-06 PROCEDURE — 36415 COLL VENOUS BLD VENIPUNCTURE: CPT

## 2018-09-06 PROCEDURE — 85025 COMPLETE CBC W/AUTO DIFF WBC: CPT

## 2018-09-06 PROCEDURE — 700111 HCHG RX REV CODE 636 W/ 250 OVERRIDE (IP): Performed by: STUDENT IN AN ORGANIZED HEALTH CARE EDUCATION/TRAINING PROGRAM

## 2018-09-06 RX ORDER — DEXTROSE MONOHYDRATE 25 G/50ML
25 INJECTION, SOLUTION INTRAVENOUS
Status: DISCONTINUED | OUTPATIENT
Start: 2018-09-06 | End: 2018-09-08 | Stop reason: HOSPADM

## 2018-09-06 RX ORDER — INSULIN GLARGINE 100 [IU]/ML
25 INJECTION, SOLUTION SUBCUTANEOUS EVERY EVENING
Status: DISCONTINUED | OUTPATIENT
Start: 2018-09-06 | End: 2018-09-08 | Stop reason: HOSPADM

## 2018-09-06 RX ORDER — INSULIN GLARGINE 100 [IU]/ML
5 INJECTION, SOLUTION SUBCUTANEOUS ONCE
Status: COMPLETED | OUTPATIENT
Start: 2018-09-06 | End: 2018-09-06

## 2018-09-06 RX ADMIN — INSULIN GLARGINE 25 UNITS: 100 INJECTION, SOLUTION SUBCUTANEOUS at 16:33

## 2018-09-06 RX ADMIN — INSULIN LISPRO 2 UNITS: 100 INJECTION, SOLUTION INTRAVENOUS; SUBCUTANEOUS at 10:48

## 2018-09-06 RX ADMIN — AMPICILLIN SODIUM AND SULBACTAM SODIUM 3 G: 2; 1 INJECTION, POWDER, FOR SOLUTION INTRAMUSCULAR; INTRAVENOUS at 00:48

## 2018-09-06 RX ADMIN — AMPICILLIN SODIUM AND SULBACTAM SODIUM 3 G: 2; 1 INJECTION, POWDER, FOR SOLUTION INTRAMUSCULAR; INTRAVENOUS at 16:30

## 2018-09-06 RX ADMIN — INSULIN LISPRO 2 UNITS: 100 INJECTION, SOLUTION INTRAVENOUS; SUBCUTANEOUS at 05:04

## 2018-09-06 RX ADMIN — METFORMIN HYDROCHLORIDE 850 MG: 850 TABLET ORAL at 16:31

## 2018-09-06 RX ADMIN — AMPICILLIN SODIUM AND SULBACTAM SODIUM 3 G: 2; 1 INJECTION, POWDER, FOR SOLUTION INTRAMUSCULAR; INTRAVENOUS at 05:01

## 2018-09-06 RX ADMIN — AMPICILLIN SODIUM AND SULBACTAM SODIUM 3 G: 2; 1 INJECTION, POWDER, FOR SOLUTION INTRAMUSCULAR; INTRAVENOUS at 10:57

## 2018-09-06 RX ADMIN — INSULIN LISPRO 3 UNITS: 100 INJECTION, SOLUTION INTRAVENOUS; SUBCUTANEOUS at 10:47

## 2018-09-06 RX ADMIN — ENOXAPARIN SODIUM 40 MG: 100 INJECTION SUBCUTANEOUS at 10:57

## 2018-09-06 RX ADMIN — INSULIN LISPRO 3 UNITS: 100 INJECTION, SOLUTION INTRAVENOUS; SUBCUTANEOUS at 05:03

## 2018-09-06 RX ADMIN — LISINOPRIL 5 MG: 2.5 TABLET ORAL at 05:02

## 2018-09-06 RX ADMIN — INSULIN GLARGINE 5 UNITS: 100 INJECTION, SOLUTION SUBCUTANEOUS at 17:30

## 2018-09-06 ASSESSMENT — ENCOUNTER SYMPTOMS
CHILLS: 0
ABDOMINAL PAIN: 0
TREMORS: 0
POLYDIPSIA: 0
HEADACHES: 0
ORTHOPNEA: 0
PALPITATIONS: 0
BLURRED VISION: 0
TINGLING: 0
NAUSEA: 0
DOUBLE VISION: 0
DIZZINESS: 0
VOMITING: 0
SHORTNESS OF BREATH: 0
FEVER: 0

## 2018-09-06 ASSESSMENT — PAIN SCALES - GENERAL
PAINLEVEL_OUTOF10: 0
PAINLEVEL_OUTOF10: 0

## 2018-09-06 NOTE — NON-PROVIDER
Internal Medicine Medical Student Note  Note Author: Jacob Roque, Student    Name Kavitha Benson     1975   Age/Sex 43 y.o. female   MRN [id number]   Code Status FULL       Reason for interval visit  (Principal Problem)   Acute osteomyelitis of toe of right foot (HCC)    Interval Problem Daily Status Update  (problem status, last 24 hours, new history, new data )     The patient is doing well today. She is willing to answer questions about her history and she reports that she is ready to go home. She denies any symptoms of infection such as fever, chills, headache, muscle pains, or night sweats. Her glucose this morning was 224 and is being controlled with insulin medications. Currently she is working with social work to figure out her insurance plan so that she can have an idea of what insulin medications she will be able to afford after discharge. She is still on antibiotics for her foot wound (day 5).       Physical Exam       Vitals:    18 1600 18 1923 18 0355 18 0800   BP: 106/72 109/68 111/64 117/63   Pulse: (!) 56 63 65 63   Resp: 16 16 16 14   Temp: 36.2 °C (97.1 °F) 36.4 °C (97.6 °F) 36.2 °C (97.2 °F) 36.4 °C (97.6 °F)   SpO2: 97% 98% 98% 94%   Weight:       Height:         Body mass index is 35.18 kg/m².    Oxygen Therapy:  Pulse Oximetry: 94 %, O2 (LPM): 0, O2 Delivery: None (Room Air)    Physical Exam  Constitutional: She is oriented to person, place, and time and well-developed, well-nourished, and in no distress. Vital signs are normal. No distress.   Head: Normocephalic and atraumatic.   Mouth/Throat: Oropharynx is clear and moist. No oropharyngeal exudate.   Eyes: Pupils are equal, round, and reactive to light. Conjunctivae and EOM are normal. Right eye exhibits no discharge. Left eye exhibits no discharge. No scleral icterus.   Neck: Neck supple.   Cardiovascular: Normal rate, regular rhythm, intact distal pulses and normal pulses.  Exam reveals  gallop (Patient has S3 gallop on examination).    Murmur (Patient has grade II/VI holosystolic murmur heard best at the left upper sternal border) heard.  Pulmonary/Chest: Effort normal and breath sounds normal. No respiratory distress. She has no wheezes. She has no rales. She exhibits no tenderness.   Abdominal: Soft. Bowel sounds are normal. She exhibits no distension and no mass. There is no tenderness. There is no rebound and no guarding.   Musculoskeletal: Normal range of motion. She exhibits no edema. Patient has amputation of second digit of right foot (currently dressed).  Neurological: She is alert and oriented to person, place, and time. No cranial nerve deficit.   Skin: Skin is warm and dry. No rash noted. She is not diaphoretic. No erythema. No pallor.   Psychiatric: Her mood appears normal.      Assessment/Plan     The patient is a 43 year old female with no significant known past medical history presenting to the emergency department for a severely inflamed and most likely infected second digit of her right foot. Currently status post second digit amputation.    #Diabetes  The patient was given 13 units of sliding scale insulin (insulin lispro) in the last 24 hours. She is currently on 25 units of Lantus post-surgery. Her blood glucose level fell in the last 24 hours and is now 255. She is on moderate intensity insulin lispro sliding scale. Since the patient has a blood pressure less than 130/90 and her history indicates her being high risk for diabetic nephropathy in the future, she has been started on 5 mg PO Qday of lisinopril today for future renal protection. She has had trouble taking the lisinopril because she doesn't like pills but she ate the pill in applesauce this morning. The patient has been seen by diabetes education to show her how to inject the insulin she will be using after discharge.    #Osteomyeltitis  The patient was diagnosed with osteomyelitis in the second digit on her right  foot caused by gram positive cocci (now identified as methicillin sensitive staph aureus). She is currently status post amputation of the affected digit. She was on IV vancomycin and Unasyn, but since the sensitivity report indicated MSSA sensitive to Unasyn, her vancomycin was discontinued and she is stillt on Unasyn. Patient's wound was cultured prior to being dressed post-operatively and the full results are currently pending. If the results come back positive for infection, then her antibiotic regimen will be modified appropriately at that time. As of right now, she is on day 5 of her Unasyn and since she has clean margins from her surgery, she should be able to discontinue her antibiotics tomorrow and get ready for discharge (projected to be on Saturday).    #Substance abuse  The patient reports methamphetamine use utilizing several different routes of administration including intravenous. The patient has had an HIV screen, and a hepatitis B and C screen ordered two days ago and the results were negative. The patient should be counseled on her drug use because use of methamphetamine could put the patient at high risk for cardiovascular abnormalities, especially with her new onset diabetes.

## 2018-09-06 NOTE — PROGRESS NOTES
Hourly rounding, call bell within reach. Patient educated about plan of care, pain management, call bell use, and mobility. Patient is independent with mobility and only partially compliant with off loading shoe for right foot. Education provided but still refuses to wear shoe at all times when out of bed. Dressing is clean dry and intact to right foot, to be changed by LPS per md note. Blood sugars check before meals and coverage provided. Will monitor.

## 2018-09-06 NOTE — PROGRESS NOTES
Diabetes education: Met with Kavitha and True Metrix meter given and instructed. Spoke with SW and pt will not have Medicaid at discharge.  Reviewed goals for blood sugars, frequency of testing, and need to eat three meals with proteins and carbs.  Plan: SW will need to pay for patient's medications and supplies at discharge. Pt will need to be changed from Lantus to Levemir vial and give prescriptions for vial ( $7.00 for SW),Regular insulin vial ( listed as Novolin/Humulin will be $7.00 for SW), with sliding scale written out and for ac only,  syringes ( 1/2 cc  6 mm box of 100), and strips and lancets for True metrix meter to test 3 times a day. Pt states she may go home on Saturday, so prescriptions need to be completed for SW to obtain cost and sent for pickup [facility] at 5 pm on Friday ( unless SW can  before 1 pm on Saturday, must be employee not family). Please call [phone] if questions.

## 2018-09-06 NOTE — PROGRESS NOTES
Internal Medicine Interval Note  Note Author: Bentmartin Aguilar D.O.     Name Kavitha Benson     1975   Age/Sex 43 y.o. female   MRN [id number]   Code Status Full     After 5PM or if no immediate response to page, please call for cross-coverage  Attending/Team: Dr. Clifford/TIMOTHY Huston See Patient List for primary contact information  Call [phone] to page    1st Call - Day Intern (R1):   Dr. Aguilar 2nd Call - Day Sr. Resident (R2/R3):   Dr. Cannon         Reason for interval visit  (Principal Problem)   Osteomyelitis second digit of the right foot     Interval Problem Daily Status Update  (24 hours, problem oriented, brief subjective history, new lab/imaging data pertinent to that problem)   No acute overnight events. Patient denies pain at this time. Patient said she was able to tolerate the Lisinopril well but she had to get it mixed in her applesauce in order for her to swallow. Patient agreeable to try oral medication along with insulin for her diabetes.     Osteomyelitis: Second Metatarsal bone culture still has no growth to date. Wound culture- MSSA. Unasyn day five. Encouraged patient to use her boot and try to ambulate.     Diabetes: Patient continues to tolerate insulin well. She received 10 units over the past day. POC glucose have been 200-300s.    Discharge planning: Social work still in the process of obtaining medical insurance for our patient.     Review of Systems   Constitutional: Negative for chills and fever.   Eyes: Negative for blurred vision and double vision.   Respiratory: Negative for shortness of breath.    Cardiovascular: Negative for chest pain, palpitations, orthopnea and leg swelling.   Gastrointestinal: Negative for abdominal pain, nausea and vomiting.   Genitourinary: Negative for dysuria, frequency and urgency.   Musculoskeletal: Negative for joint pain.   Neurological: Negative for dizziness, tingling, tremors and headaches.   Endo/Heme/Allergies: Negative for  polydipsia.     Disposition/Barriers to discharge:   Inpatient being treated for Osteomyelitis of second digit of right foot and Diabetes management.    Consultants/Specialty  Orthopedics  Limb Preservation Service  PCP: Pcp Pt States None    Quality Measures  Quality-Core Measures   Reviewed items::  EKG reviewed, Labs reviewed, Medications reviewed and Radiology images reviewed  Duarte catheter::  No Duarte  DVT prophylaxis pharmacological::  Enoxaparin (Lovenox)      Physical Exam       Vitals:    09/05/18 1600 09/05/18 1923 09/06/18 0355 09/06/18 0800   BP: 106/72 109/68 111/64 117/63   Pulse: (!) 56 63 65 63   Resp: 16 16 16 14   Temp: 36.2 °C (97.1 °F) 36.4 °C (97.6 °F) 36.2 °C (97.2 °F) 36.4 °C (97.6 °F)   SpO2: 97% 98% 98% 94%   Weight:       Height:         Body mass index is 35.18 kg/m².    Oxygen Therapy:  Pulse Oximetry: 94 %, O2 (LPM): 0, O2 Delivery: None (Room Air)    Physical Exam   Constitutional: She is oriented to person, place, and time. No distress.   Laying in bed appearing comfortable.    HENT:   Head: Normocephalic and atraumatic.   Mouth/Throat: Oropharynx is clear and moist. No oropharyngeal exudate.   Poor oral hygiene   Eyes: EOM are normal. No scleral icterus.   Cardiovascular: Normal rate, regular rhythm and intact distal pulses.    Murmur (II/IV holosystolic murmur heard best at left sternal border) heard.  Pulmonary/Chest: Effort normal and breath sounds normal. No respiratory distress. She has no wheezes.   Musculoskeletal: She exhibits no edema.   Right second toe wrapped in bandage   Neurological: She is alert and oriented to person, place, and time.   Moving all extremities spontaneously   Skin: She is not diaphoretic.   Multiple excoriations on lower extremities   Psychiatric: Affect normal. She is not agitated.       Assessment/Plan     * Acute osteomyelitis of toe of right foot (HCC)   Assessment & Plan    - Osteomyelitis of second digit of right foot superimposed by cellulitis  "s/p amputation 9/4/18- complicated with diagnosis of new onset of Diabetes  - Wound cx: Methicillin sensitive Staphylococcus Aureus  - Blood cx: No growth  - continue Unasyn and discontinue on 9/71/18  - OR cultures from second metatarsal joint have been negative so far  - Limb Preservation service following          Uncontrolled diabetes mellitus (HCC)   Assessment & Plan    - Newly diagnosed with no known prior history of DM  - HbA1C 13.2 on admission   - POCs 200-300s  - Elevated blood sugars secondary to stress response from surgery 9/4/18  - Social work in the process of obtaining insurance for patient which will determine which type of insulin patient will be on. However for now, we will continue Lantus.   - increase Lantus to 25u   - Start Metformin 875mg once a day and gradually titrate up  - Sliding scale insulin with meals   - Discontinue meal time insulin   - Lisinopril 5mg   - Appreciate input from Diabetes education   - Lipid Profile unremarkable on admission        Substance abuse   Assessment & Plan    - methamphetamine (\"speed\") last used on 9/2/18  - UDS + for amphetamines   - Drug abuse counseling   - hx of IVDU  - negative screening for Hepatitis C and HIV        Hypomagnesemia   Assessment & Plan    - Replete as needed  - continue to monitor        Hypokalemia   Assessment & Plan    - Oral Repletion as needed   - Continue to monitor          "

## 2018-09-06 NOTE — PROGRESS NOTES
"LIMB PRESERVATION SERVICE     43 y.o. female admitted 9/2/2018, with a past medical history that includes uncontrolled type 2 diabetes  admitted for Diabetes (HCC) Cellulitis and abscess of toe of right foot.    POD # 2 s/p R 2nd toe amputation by Dr. Stevens    /63   Pulse 63   Temp 36.4 °C (97.6 °F)   Resp 14   Ht 1.702 m (5' 7\")   Wt 101.9 kg (224 lb 10.4 oz)   SpO2 94%   Breastfeeding? No   BMI 35.18 kg/m²   Pain controlled, tender to palpation  Offloading shoe at bedside      DM:   Newly diagnosed  Seen by CDE, several visits  a1c 13.1%  Glucose this am 215    Cellulitis:   Managed by [facility]  On unasyn  OR cx amputated bone positive  Bone cx of MTH growing MSSA isolated from broth        Pulses  +2 pedal pulses to RLE  Sensation decreased to foot      R 2nd toe amp incision:  Incision approximated with sutures  No new drainage  No Erythema  Edema decreased   Pain with palpation      Nursing to apply and teach dressing change to pt, adaptic, gauze, kerlix, ace wrap    Edu:    Implications of loss of protective sensation (LOPS) discussed with patient- including increased risk for amputation.  Advised to check feet at least daily, moisturize feet, and to always wear protective foot wear inside and outside of home.       PLAN  Wound care orders placed for nursing daily  WBAT RLE in offloading shoe when OOB  Recommend Oral abx short course   Keep blood glucose levels <150 for improved wound healing       D/C plan: complex social needs. SW involved. Plan for home. nursing to teach incision care. Change incision daily for first week, then every other day after first week if no drainage present.   tpt Not to get incision wet, cover while showering.     F/u with Dr. Stevens in 3 wks at [facility] for suture removal and diabetic shoes Rx at post op visit                      "

## 2018-09-06 NOTE — CARE PLAN
Problem: Infection  Goal: Will remain free from infection  Outcome: PROGRESSING AS EXPECTED      Problem: Knowledge Deficit  Goal: Knowledge of disease process/condition, treatment plan, diagnostic tests, and medications will improve  Outcome: PROGRESSING AS EXPECTED

## 2018-09-07 LAB
ANION GAP SERPL CALC-SCNC: 4 MMOL/L (ref 0–11.9)
BACTERIA BLD CULT: NORMAL
BACTERIA BLD CULT: NORMAL
BACTERIA SPEC ANAEROBE CULT: NORMAL
BUN SERPL-MCNC: 12 MG/DL (ref 8–22)
CALCIUM SERPL-MCNC: 8.6 MG/DL (ref 8.5–10.5)
CHLORIDE SERPL-SCNC: 102 MMOL/L (ref 96–112)
CO2 SERPL-SCNC: 32 MMOL/L (ref 20–33)
CREAT SERPL-MCNC: 0.53 MG/DL (ref 0.5–1.4)
ERYTHROCYTE [DISTWIDTH] IN BLOOD BY AUTOMATED COUNT: 39 FL (ref 35.9–50)
GLUCOSE BLD-MCNC: 193 MG/DL (ref 65–99)
GLUCOSE BLD-MCNC: 232 MG/DL (ref 65–99)
GLUCOSE BLD-MCNC: 258 MG/DL (ref 65–99)
GLUCOSE BLD-MCNC: 281 MG/DL (ref 65–99)
GLUCOSE SERPL-MCNC: 211 MG/DL (ref 65–99)
HCT VFR BLD AUTO: 39.9 % (ref 37–47)
HGB BLD-MCNC: 12.6 G/DL (ref 12–16)
MCH RBC QN AUTO: 27 PG (ref 27–33)
MCHC RBC AUTO-ENTMCNC: 31.6 G/DL (ref 33.6–35)
MCV RBC AUTO: 85.4 FL (ref 81.4–97.8)
PLATELET # BLD AUTO: 341 K/UL (ref 164–446)
PMV BLD AUTO: 9.4 FL (ref 9–12.9)
POTASSIUM SERPL-SCNC: 3.5 MMOL/L (ref 3.6–5.5)
RBC # BLD AUTO: 4.67 M/UL (ref 4.2–5.4)
SIGNIFICANT IND 70042: NORMAL
SITE SITE: NORMAL
SODIUM SERPL-SCNC: 138 MMOL/L (ref 135–145)
SOURCE SOURCE: NORMAL
WBC # BLD AUTO: 13.9 K/UL (ref 4.8–10.8)

## 2018-09-07 PROCEDURE — 36415 COLL VENOUS BLD VENIPUNCTURE: CPT

## 2018-09-07 PROCEDURE — 82962 GLUCOSE BLOOD TEST: CPT

## 2018-09-07 PROCEDURE — 770006 HCHG ROOM/CARE - MED/SURG/GYN SEMI*

## 2018-09-07 PROCEDURE — 700102 HCHG RX REV CODE 250 W/ 637 OVERRIDE(OP): Performed by: STUDENT IN AN ORGANIZED HEALTH CARE EDUCATION/TRAINING PROGRAM

## 2018-09-07 PROCEDURE — A9270 NON-COVERED ITEM OR SERVICE: HCPCS | Performed by: STUDENT IN AN ORGANIZED HEALTH CARE EDUCATION/TRAINING PROGRAM

## 2018-09-07 PROCEDURE — 99232 SBSQ HOSP IP/OBS MODERATE 35: CPT | Performed by: INTERNAL MEDICINE

## 2018-09-07 PROCEDURE — 85027 COMPLETE CBC AUTOMATED: CPT

## 2018-09-07 PROCEDURE — 700111 HCHG RX REV CODE 636 W/ 250 OVERRIDE (IP): Performed by: STUDENT IN AN ORGANIZED HEALTH CARE EDUCATION/TRAINING PROGRAM

## 2018-09-07 PROCEDURE — 700111 HCHG RX REV CODE 636 W/ 250 OVERRIDE (IP): Performed by: INTERNAL MEDICINE

## 2018-09-07 PROCEDURE — 700105 HCHG RX REV CODE 258: Performed by: INTERNAL MEDICINE

## 2018-09-07 PROCEDURE — 80048 BASIC METABOLIC PNL TOTAL CA: CPT

## 2018-09-07 RX ORDER — SYRINGE-NEEDLE,INSULIN,0.5 ML 27GX1/2"
1 SYRINGE, EMPTY DISPOSABLE MISCELLANEOUS 4 TIMES DAILY
Qty: 100 EACH | Refills: 1 | Status: SHIPPED | OUTPATIENT
Start: 2018-09-07

## 2018-09-07 RX ORDER — POTASSIUM CHLORIDE 20 MEQ/1
40 TABLET, EXTENDED RELEASE ORAL ONCE
Status: COMPLETED | OUTPATIENT
Start: 2018-09-07 | End: 2018-09-07

## 2018-09-07 RX ORDER — LISINOPRIL 5 MG/1
5 TABLET ORAL DAILY
Qty: 30 TAB | Refills: 1 | Status: SHIPPED | OUTPATIENT
Start: 2018-09-07

## 2018-09-07 RX ADMIN — INSULIN GLARGINE 25 UNITS: 100 INJECTION, SOLUTION SUBCUTANEOUS at 17:30

## 2018-09-07 RX ADMIN — AMPICILLIN AND SULBACTAM 3 G: 2; 1 INJECTION, POWDER, FOR SOLUTION INTRAVENOUS at 06:48

## 2018-09-07 RX ADMIN — METFORMIN HYDROCHLORIDE 850 MG: 850 TABLET ORAL at 07:44

## 2018-09-07 RX ADMIN — LISINOPRIL 5 MG: 2.5 TABLET ORAL at 06:35

## 2018-09-07 RX ADMIN — ENOXAPARIN SODIUM 40 MG: 100 INJECTION SUBCUTANEOUS at 11:35

## 2018-09-07 RX ADMIN — POTASSIUM CHLORIDE 40 MEQ: 1500 TABLET, EXTENDED RELEASE ORAL at 07:44

## 2018-09-07 RX ADMIN — AMPICILLIN AND SULBACTAM 3 G: 2; 1 INJECTION, POWDER, FOR SOLUTION INTRAVENOUS at 17:30

## 2018-09-07 RX ADMIN — AMPICILLIN AND SULBACTAM 3 G: 2; 1 INJECTION, POWDER, FOR SOLUTION INTRAVENOUS at 11:39

## 2018-09-07 RX ADMIN — AMPICILLIN AND SULBACTAM 3 G: 2; 1 INJECTION, POWDER, FOR SOLUTION INTRAVENOUS at 00:17

## 2018-09-07 ASSESSMENT — PAIN SCALES - GENERAL
PAINLEVEL_OUTOF10: 0
PAINLEVEL_OUTOF10: 0

## 2018-09-07 ASSESSMENT — ENCOUNTER SYMPTOMS
VOMITING: 0
ABDOMINAL PAIN: 0
DIZZINESS: 0
HEADACHES: 0
SHORTNESS OF BREATH: 0
POLYDIPSIA: 0
DOUBLE VISION: 0
TREMORS: 0
PALPITATIONS: 0
ORTHOPNEA: 0
NAUSEA: 0
BLURRED VISION: 0
FEVER: 0
TINGLING: 0
CHILLS: 0

## 2018-09-07 NOTE — DISCHARGE PLANNING
Anticipated Discharge Disposition: Home    Action: LSW left message w/ after hours LSW and requested that pt's prescriptions be taken to the health care pharmacy and filled. LSW stated that after hours LSW will need to write an approved services for the cost of the prescriptions and deliver them to pt. [facility] is closed to public on Saturdays but LSWs can  before noon. Report left on weekend LSWs VM.    Barriers to Discharge: None    Plan: TBD

## 2018-09-07 NOTE — CARE PLAN
Problem: Communication  Goal: The ability to communicate needs accurately and effectively will improve    Intervention: Educate patient and significant other/support system about the plan of care, procedures, treatments, medications and allow for questions  White board updated with day staff and return time.  PT notified of hourly rounding and unit routine.   Appropriate signs in place at doorway for pt.       Problem: Safety  Goal: Will remain free from falls    Intervention: Implement fall precautions  Pt calls appropriately, treaded socks in use. Personal belongings and call light with in reach and bed is locked in lowest position. Hourly rounding in place

## 2018-09-07 NOTE — NON-PROVIDER
Internal Medicine Medical Student Note  Note Author: Jacob Roque, Student    Name Kavitha Benson     1975   Age/Sex 43 y.o. female   MRN [id number]   Code Status FULL       Reason for interval visit  (Principal Problem)   Acute osteomyelitis of toe of right foot (HCC)    Interval Problem Daily Status Update  (problem status, last 24 hours, new history, new data )     The patient appears to be doing very well today. She is actively moving and eating. She is scheduled to be discharged tomorrow and she is prepared/ready for that. She has been made aware of the importance of wearing her offloading shoe as often as possible, especially after discharge. She denies experiencing any muscle pains, wound pain, fever, chills, headaches, nausea, vomiting, diarrhea, or constipation. She denies any urinary problems as well. Her glucose level this morning was 258. She had an elevated white blood cell count today that is likely due to reactive leukocytosis from her surgery. Social work has been consulted about the patient's discharge medication plan and they have advised that the insulin prescription plan outlined by Nancy Neumann (diabetes health educator) be utilized for the patient since she does not have health insurance.       Physical Exam       Vitals:    18 1600 18 2000 18 0416 18 0700   BP: 121/65 123/72 121/60 115/69   Pulse: 62 62 (!) 57 73   Resp: 16 18 18 15   Temp: 36.4 °C (97.5 °F) 36.7 °C (98.1 °F) 36.7 °C (98 °F) 36.1 °C (97 °F)   SpO2: 96% 99% 96% 98%   Weight:       Height:         Body mass index is 35.18 kg/m².    Oxygen Therapy:  Pulse Oximetry: 98 %, O2 (LPM): 0, O2 Delivery: None (Room Air)    Physical Exam  Constitutional: She is oriented to person, place, and time and well-developed, well-nourished, and in no distress. Vital signs are normal. No distress.   Head: Normocephalic and atraumatic.   Mouth/Throat: Oropharynx is clear and moist. No oropharyngeal  exudate.   Eyes: Pupils are equal, round, and reactive to light. Conjunctivae and EOM are normal. Right eye exhibits no discharge. Left eye exhibits no discharge. No scleral icterus.   Neck: Neck supple.   Cardiovascular: Normal rate, regular rhythm, intact distal pulses and normal pulses.  Exam reveals gallop (Patient has S3 gallop on examination).    Murmur (Patient has grade II/VI holosystolic murmur heard best at the left upper sternal border) heard.  Pulmonary/Chest: Effort normal and breath sounds normal. No respiratory distress. She has no wheezes. She has no rales. She exhibits no tenderness.   Abdominal: Soft. Bowel sounds are normal. She exhibits no distension and no mass. There is no tenderness. There is no rebound and no guarding.   Musculoskeletal: Normal range of motion. She exhibits no edema. Patient has amputation of second digit of right foot (currently dressed).  Neurological: She is alert and oriented to person, place, and time. No cranial nerve deficit.   Skin: Skin is warm and dry. No rash noted. She is not diaphoretic. No erythema. No pallor.   Psychiatric: Her mood appears normal.      Assessment/Plan     The patient is a 43 year old female with no significant known past medical history presenting to the emergency department for a severely inflamed and most likely infected second digit of her right foot. Currently status post second digit amputation.    #Diabetes  The patient was started on metformin 850 mg PO Qday yesterday. She was given 14 units of sliding scale insulin (insulin lispro) in the last 24 hours. She is currently on 25 units of Lantus post-surgery. Her blood glucose level is now 258. She is on moderate intensity insulin lispro sliding scale. Since the patient has a blood pressure less than 130/90 and her history indicates her being high risk for diabetic nephropathy in the future, she has been started on 5 mg PO Qday of lisinopril today for future renal protection. Diabetes  education met with the patient and gave her a True Metrix meter and instructed her on how to use it. The patient will not have any health insurance at discharge so diabetes education suggested that patient be switched from Lantus to Levemir vial and be given prescriptions for the Levemir vial, Novolin/Humilin insulin vial with sliding scale written out and for ac only, syringes (1/2 cc 6mm box of 100), and strips and lancets for True Metrix meter to test three times a day. Social work will need to pay for the patient's medications and supplies at discharge. If the patient comes into the clinic at [facility] in one month, social work says she should be able to receive these medications at a reduced price that should be affordable for her.    #Osteomyeltitis  The patient was diagnosed with osteomyelitis in the second digit on her right foot caused by gram positive cocci (now identified as methicillin sensitive staph aureus). She is currently status post amputation of the affected digit. She was on IV vancomycin and Unasyn, but since the sensitivity report indicated MSSA sensitive to Unasyn, her vancomycin was discontinued and she is stillt on Unasyn. Patient's wound was cultured prior to being dressed post-operatively and the full results are currently pending. If the results come back positive for infection, then her antibiotic regimen will be modified appropriately at that time. As of right now, she is on day 6 of her Unasyn and since she has clean margins from her surgery, she will discontinue her antibiotics today in preparation for discharge tomorrow.    #Substance abuse  The patient reports methamphetamine use utilizing several different routes of administration including intravenous. The patient has had an HIV screen, and a hepatitis B and C screen ordered three days ago and the results were negative. The patient should be counseled on her drug use because use of methamphetamine could put the patient at high risk  for cardiovascular abnormalities, especially with her new onset diabetes.

## 2018-09-08 ENCOUNTER — PATIENT OUTREACH (OUTPATIENT)
Dept: HEALTH INFORMATION MANAGEMENT | Facility: OTHER | Age: 43
End: 2018-09-08

## 2018-09-08 VITALS
HEART RATE: 62 BPM | OXYGEN SATURATION: 97 % | SYSTOLIC BLOOD PRESSURE: 106 MMHG | DIASTOLIC BLOOD PRESSURE: 52 MMHG | WEIGHT: 224.65 LBS | TEMPERATURE: 97.5 F | BODY MASS INDEX: 35.26 KG/M2 | HEIGHT: 67 IN | RESPIRATION RATE: 18 BRPM

## 2018-09-08 LAB
BACTERIA SPEC ANAEROBE CULT: NORMAL
GLUCOSE BLD-MCNC: 194 MG/DL (ref 65–99)
SIGNIFICANT IND 70042: NORMAL
SITE SITE: NORMAL
SOURCE SOURCE: NORMAL

## 2018-09-08 PROCEDURE — 82962 GLUCOSE BLOOD TEST: CPT

## 2018-09-08 PROCEDURE — 99238 HOSP IP/OBS DSCHRG MGMT 30/<: CPT | Performed by: INTERNAL MEDICINE

## 2018-09-08 PROCEDURE — A9270 NON-COVERED ITEM OR SERVICE: HCPCS | Performed by: STUDENT IN AN ORGANIZED HEALTH CARE EDUCATION/TRAINING PROGRAM

## 2018-09-08 PROCEDURE — 700102 HCHG RX REV CODE 250 W/ 637 OVERRIDE(OP): Performed by: STUDENT IN AN ORGANIZED HEALTH CARE EDUCATION/TRAINING PROGRAM

## 2018-09-08 RX ORDER — ACETAMINOPHEN 325 MG/1
650 TABLET ORAL EVERY 6 HOURS PRN
Qty: 30 TAB | Refills: 0 | COMMUNITY
Start: 2018-09-08

## 2018-09-08 RX ADMIN — METFORMIN HYDROCHLORIDE 850 MG: 850 TABLET ORAL at 07:40

## 2018-09-08 RX ADMIN — LISINOPRIL 5 MG: 2.5 TABLET ORAL at 05:24

## 2018-09-08 ASSESSMENT — PAIN SCALES - GENERAL: PAINLEVEL_OUTOF10: 2

## 2018-09-08 NOTE — DISCHARGE INSTRUCTIONS
Discharge Instructions    Discharged to home by car with self. Discharged via walking, hospital escort: Refused.  Special equipment needed: Not Applicable    Be sure to schedule a follow-up appointment with your primary care doctor or any specialists as instructed.     Discharge Plan:   Influenza Vaccine Indication: Patient Refuses    I understand that a diet low in cholesterol, fat, and sodium is recommended for good health. Unless I have been given specific instructions below for another diet, I accept this instruction as my diet prescription.   Other diet:     Special Instructions: None    · Is patient discharged on Warfarin / Coumadin?   No     Depression / Suicide Risk    As you are discharged from this [facility] facility, it is important to learn how to keep safe from harming yourself.    Recognize the warning signs:  · Abrupt changes in personality, positive or negative- including increase in energy   · Giving away possessions  · Change in eating patterns- significant weight changes-  positive or negative  · Change in sleeping patterns- unable to sleep or sleeping all the time   · Unwillingness or inability to communicate  · Depression  · Unusual sadness, discouragement and loneliness  · Talk of wanting to die  · Neglect of personal appearance   · Rebelliousness- reckless behavior  · Withdrawal from people/activities they love  · Confusion- inability to concentrate     If you or a loved one observes any of these behaviors or has concerns about self-harm, here's what you can do:  · Talk about it- your feelings and reasons for harming yourself  · Remove any means that you might use to hurt yourself (examples: pills, rope, extension cords, firearm)  · Get professional help from the community (Mental Health, Substance Abuse, psychological counseling)  · Do not be alone:Call your Safe Contact- someone whom you trust who will be there for you.  · Call your local CRISIS HOTLINE 057-1139 or 916-774-9117  · Call  your local Children's Mobile Crisis Response Team [place] (849) 461-9367 or www.[employer] [place]  · Call the toll free National Suicide Prevention Hotlines   · National Suicide Prevention Lifeline 905-861-WZZC (7870)  · National Hope Line Network 800-SUICIDE (786-0667)

## 2018-09-08 NOTE — DISCHARGE PLANNING
Medical Social Work    LSW received request from [employer], unit LSW, to f/u on prescriptions for pt.     LSW faxed prescriptions, except lisinopril ($4 at [facility]) to [facility]. LSW will need to contact them in the morning to obtain approved services price.

## 2018-09-08 NOTE — CARE PLAN
Problem: Infection  Goal: Will remain free from infection  Pt completed her doses of IV abx.     Problem: Discharge Barriers/Planning  Goal: Patient's continuum of care needs will be met  Outcome: NOT MET  Pt is very reluctant to learn how to help herself with her new diagnosis of DM. Will follow up with any education needed with pt. DM educator also has been by to see pt.

## 2018-09-08 NOTE — PROGRESS NOTES
Discharge to home by self in stable condition. Pt aware to  meds off of [address] as instructed by MD and TRUDY. Address provided to pt. Provided elizabeth/jennyfer, pt to call and schedule follow up with trisha.

## 2018-09-08 NOTE — DISCHARGE PLANNING
Anticipated dc disposition: home with meds covered by Approved services.     Action:  [facility] pt’s meds. The 7 meds are Humulin R $45.59, Levimir $7.59, Metformin $8.22, Test strips 44.00, Box syringes 20.07, lancets $8.85, Prinivil 7.76. [facility]. Met at bedside to give instructions ot p tot get there. Jose Antonio bedside RN aware. RX attached to chart.     Barriers to discharge-no insurance to pay for meds.     Plan: pt to  meds prior to pharmacy closing at 1:00 pm. Notified [facility] pt also needs a MD note for back to work.

## 2018-09-08 NOTE — PROGRESS NOTES
Voiced no new complaints. States readiness for discharge home. Tolerating po's, voiding w/out problems. OOB ad alvino w/ steady gait.

## 2018-09-08 NOTE — PROGRESS NOTES
Assumed care of patent at 1915, received report from day RN at that time. Communication board updated and reviewed POC with pt. Pt is now SL.  Walking boot at bedside, pt is aware that she is supposed to wear it when getting out of bed. Pt denies pain at this time. Assessment complete. Pt refused to give her own insulin this evening, pt states that she will do it when she gets home.  No other needs at this time. Call light and personal belongings within reach.

## 2018-09-08 NOTE — DISCHARGE SUMMARY
"        Internal Medicine Discharge Summary  Note Author: Lilibeth Cannon M.D.       Name Kavitha Benson     1975   Age/Sex 43 y.o. female   MRN [id number]         Admit Date:  2018       Discharge Date:   2018     Service:   [facility] Internal Medicine Yellow Team  Attending Physician(s):   Dr. Clifford      Senior Resident(s):   Dr. Cannon  Roni Resident(s):   Dr. Aguilar  PCP: Pcp Pt States None      Primary Diagnosis:   Acute osteomyelitis of toe of right foot    Secondary Diagnoses:                  Uncontrolled diabetes mellitus    Substance abuse    Hypokalemia    Hypomagnesemia  Resolved Problems:    Hyponatremia    Leukocytosis    Abnormal serum level of alkaline phosphatase      Hospital Summary (Brief Narrative):       43 year year old female with past medical history of meth use presented with complaints of worsening swelling of 2nd toe on right foot. She is a bit vague regarding onset however she states it has been going on for weeks and she has been \"messing with it\". She denies associated history of fever, chills, malaise. Denies trauma to her foot however does acknowledge she was trying to clip her toe nails. X-ray of her foot confirmed evidence of osteomyelitis. She was empirically started on Unasyn and vancomycin on  and underwent right second toe amputation on . Wound cultures were significant for Methicillin sensitive Staphylococcus Aureus. Blood cultures no growth. Vancomycin was subsequently discontinued and she continued intravenous Unasyn for total of 7 days. To follow-up with [facility] ([facility]) in 3 weeks with Dr. Stevens    She was incidentally found to have blood sugars of 431. This is a new diagnosis for her. Patient states she hasn't seen a doctor in years for annual check ups. HbA1c 13.2. She was started on insulin; diabetes education seen patient. Social workers arranged for patient to receive medications for free. She is to follow-up with [facility] clinic versus " "[facility].    Patient /Hospital Summary (Details -- Problem Oriented) :          Uncontrolled diabetes mellitus (HCC)   Assessment & Plan    - Newly diagnosed with no known prior history of diabetes mellitus. HbA1C 13.2 on admission. Diabetes education consulted. Social workers assisted patient to get insulin and other supplies for patient. She is to pick them up from the pharmacy by 1:00 pm. She was also discharged with lisinopril 5 mg for renal protection; she is to pick this up from [facility] (one of the $4 medications). She is to follow-up with [facility] or [facility] for further management. She is encouraged to stay adherent to therapy. She is also recommended for annual eye exams, regular foot exams and other routine screening.       * Acute osteomyelitis of toe of right foot (HCC)   Assessment & Plan    She was found to have Osteomyelitis of second digit of right foot on x-ray of foot with superimposed by cellulitis. She was empirically started on on unasyn and vancomycin. She underwent amputation of the 2nd toe on 9/4/18. Wound cultures: Methicillin sensitive Staphylococcus Aureus. Blood cultures: No growth. Antibiotics were transitioned to unasyn; completed a total of 7 days of IV antibiotics. Clean culture of amputated toe was negative for growth, indicating source of infection was completely removed. She was educated regarding proper incision care. She is to follow-up with Dr. Stevens in [facility] in 3 weeks. She is to use her boot while ambulating/on her feet      Substance abuse   Assessment & Plan    Used methamphetamine (\"speed\") last on 9/2/18. Drug screen was positive for amphetamines. She has history of intravenous drug use as well in the past. HIV and hepatitis C screenings were negative. She was counseled against using drugs and recommended strict cessation      Hypomagnesemia   Assessment & Plan    - Replete as needed      Hypokalemia   Assessment & Plan    - " Replete as needed.      Abnormal serum level of alkaline phosphatase-resolved as of 9/4/2018   Assessment & Plan    - present on admission secondary to osteomyelitis. Has since normalized. No further intervention      Leukocytosis-resolved as of 9/4/2018   Assessment & Plan    Secondary to osteomyelitis. Leukocytosis resolved upon initiation of intravenous antibiotics for osteomyelitis.       Hyponatremia-resolved as of 9/4/2018   Assessment & Plan    On presentation, patient had pseudohyponatremia due to hyperglycemic state on admission. Corrected for hyperglycemia, sodium is 138, within normal range. Sodium has since normalized as hyperglycemia has been corrected with diabetes mellitus management          Consultants:     Orthopedics: Dr. Stevens  Diabetes Educator  Limb Preservation Services    Procedures:        Right second toe amputation 9/4/2018    Imaging/ Testing:      DX-FOOT-COMPLETE 3+ RIGHT   Final Result      Extensive soft tissue swelling with extensive bone destruction involving the second middle and distal phalanges suggesting osteomyelitis with a destructive neoplastic process seeming less likely.      Similar findings are also noted to a lesser degree involving the third and fourth phalangeal marilin.            Discharge Medications:         Medication Reconciliation: Completed       Medication List      START taking these medications      Instructions   acetaminophen 325 MG Tabs  Commonly known as:  TYLENOL   Take 2 Tabs by mouth every 6 hours as needed (Mild Pain; (Pain scale 1-3); Temp greater than 100.5 F).  Dose:  650 mg     glucose blood strip   Test strips order: Test strips for True Metrix meter. Use and as needed during times of high or low blood sugars.     insulin detemir 100 UNIT/ML Soln  Commonly known as:  LEVEMIR   Inject 13 Units as instructed 2 times a day.  Dose:  13 Units     insulin lispro 100 UNIT/ML Soln  Commonly known as:  HUMALOG   Inject 2-9 Units as instructed 4 Times a  "Day,Before Meals and at Bedtime.  Dose:  2-9 Units     insulin regular 100 Unit/mL Soln  Commonly known as:  HUMULIN R   Doctor's comments:  For glucose:  70   - 150  mg/dL =    0 Units  151 - 200  mg/dL =    2 Units  201 - 250  mg/dL =    3 Units  251 - 300  mg/dL  =   5 Units  301 - 350  mg/dL  =   6 Units  351 - 400 mg/dL   =   8 Units  Over 400 mg/dL   =   9 Units  Over 400 mg/dL x 2 consecutive FSBG = 9 Units and call MD  Inject 2-9 Units as instructed 3 times a day before meals.  Dose:  2-9 Units     Insulin Syringe-Needle U-100 30G X 5/16\" 0.5 ML Misc   1 Syringe by Does not apply route 4 times a day.  Dose:  1 Syringe     Lancet Devices Misc   Lancet device order: Device for True Metrix lancets.     lisinopril 5 MG Tabs  Commonly known as:  PRINIVIL   Take 1 Tab by mouth every day.  Dose:  5 mg     metFORMIN 850 MG Tabs  Commonly known as:  GLUCOPHAGE   Take 1 Tab by mouth 2 times a day, with meals.  Dose:  850 mg               Disposition:   Home    Diet:   Diabetic    Activity:   Use boot    Instructions:      Take medications as prescribed  Follow-up with ortho clinic (Dr. Stevens) in 3 weeks for suture removal and diabetic shoes  Follow-up with [facility] or [facility] clinic for further optimization/monitoring of her Diabetes Mellitus    The patient was instructed to return to the ER in the event of worsening symptoms. I have counseled the patient on the importance of compliance and the patient has agreed to proceed with all medical recommendations and follow up plan indicated above.   The patient understands that all medications come with benefits and risks. Risks may include permanent injury or death and these risks can be minimized with close reassessment and monitoring.        Primary Care Provider:    Not established  Discharge summary faxed to primary care provider:  Deferred  Copy of discharge summary given to the patient: Deferred      Follow up appointment details :      Follow-up " with Dr. Stevens in 3 weeks at [facility] for suture removal and diabetic shoes          Discharge Time (Minutes) :    45 minutes  Hospital Course Type:  Inpatient Stay >2 midnights      Condition on Discharge: Hemodynamically stable, wound healing well. Blood sugars improved    ______________________________________________________________________    Interval history/exam for day of discharge:    No overnight events. Has completed intravenous antibiotic course. To be discharged with strict instructions to  medications from pharmacy that social workers have arranged    Vitals:    09/07/18 1600 09/07/18 2100 09/08/18 0400 09/08/18 0800   BP: 105/59 115/60 139/74 106/52   Pulse: 69 67 64 62   Resp: 17 20 20 18   Temp: 36.6 °C (97.9 °F) 36.6 °C (97.9 °F) 36.8 °C (98.3 °F) 36.4 °C (97.5 °F)   SpO2: 95% 98% 97% 97%   Weight:       Height:         General: Not in acute distress. Poor oral hygiene.   Cardiovascular: Normal heart rate, Normal rhythm. Holosystolic murmur at sternal border   Lungs: Respiratory effort is normal. Normal breath sounds  Abdomen: Bowel sounds normal, Soft, No tenderness  Skin: No erythema, Multiple excoriations on lower extremities  Lower limbs: Foot wrapped in clean, intact bandage  Neurologic: Alert & oriented x 3, Normal motor function, No focal deficits noted, cranial nerves II through XII are normal           Most Recent Labs:    Lab Results   Component Value Date/Time    WBC 13.9 (H) 09/07/2018 05:16 AM    RBC 4.67 09/07/2018 05:16 AM    HEMOGLOBIN 12.6 09/07/2018 05:16 AM    HEMATOCRIT 39.9 09/07/2018 05:16 AM    MCV 85.4 09/07/2018 05:16 AM    MCH 27.0 09/07/2018 05:16 AM    MCHC 31.6 (L) 09/07/2018 05:16 AM    MPV 9.4 09/07/2018 05:16 AM    NEUTSPOLYS 64.50 09/06/2018 04:44 AM    LYMPHOCYTES 27.10 09/06/2018 04:44 AM    MONOCYTES 5.90 09/06/2018 04:44 AM    EOSINOPHILS 1.80 09/06/2018 04:44 AM    BASOPHILS 0.30 09/06/2018 04:44 AM      Lab Results   Component Value Date/Time    SODIUM 138  09/07/2018 05:16 AM    POTASSIUM 3.5 (L) 09/07/2018 05:16 AM    CHLORIDE 102 09/07/2018 05:16 AM    CO2 32 09/07/2018 05:16 AM    GLUCOSE 211 (H) 09/07/2018 05:16 AM    BUN 12 09/07/2018 05:16 AM    CREATININE 0.53 09/07/2018 05:16 AM      Lab Results   Component Value Date/Time    ALTSGPT 7 09/04/2018 04:00 AM    ASTSGOT 10 (L) 09/04/2018 04:00 AM    ALKPHOSPHAT 73 09/04/2018 04:00 AM    TBILIRUBIN 0.3 09/04/2018 04:00 AM    ALBUMIN 2.7 (L) 09/04/2018 04:00 AM    GLOBULIN 4.0 (H) 09/04/2018 04:00 AM    INR 1.02 09/04/2018 04:00 AM     Lab Results   Component Value Date/Time    PROTHROMBTM 13.1 09/04/2018 04:00 AM    INR 1.02 09/04/2018 04:00 AM

## 2018-09-08 NOTE — PROGRESS NOTES
Internal Medicine Interval Note  Note Author: Lilibeth Cannon M.D.     Name Kavitha Benson     1975   Age/Sex 43 y.o. female   MRN [id number]   Code Status Full     After 5PM or if no immediate response to page, please call for cross-coverage  Attending/Team: Dr. Clifford/TIMOTHY Huston See Patient List for primary contact information  Call [phone] to page    1st Call - Day Intern (R1):   Dr. Aguilar 2nd Call - Day Sr. Resident (R2/R3):   Dr. Cannon         Reason for interval visit  (Principal Problem)   Osteomyelitis second digit of the right foot     Interval Problem Daily Status Update  (24 hours, problem oriented, brief subjective history, new lab/imaging data pertinent to that problem)   No acute overnight events. Patient denies pain at this time. In good spirits today; smiling. She notes feeling more like herself.   - Encouraged her to use her boot while ambulating  - Completing IV antibiotics today  - SW assisting patient to get medications; plan for discharge tomorrow morning.     Review of Systems   Constitutional: Negative for chills and fever.   Eyes: Negative for blurred vision and double vision.   Respiratory: Negative for shortness of breath.    Cardiovascular: Negative for chest pain, palpitations, orthopnea and leg swelling.   Gastrointestinal: Negative for abdominal pain, nausea and vomiting.   Genitourinary: Negative for dysuria, frequency and urgency.   Musculoskeletal: Negative for joint pain.   Neurological: Negative for dizziness, tingling, tremors and headaches.   Endo/Heme/Allergies: Negative for polydipsia.       Disposition/Barriers to discharge:   Inpatient; plan for discharge tomorrow morning.     Consultants/Specialty  Orthopedics  Limb Preservation Service  PCP: Pcp Pt States None    Quality Measures  Quality-Core Measures   Reviewed items::  EKG reviewed, Labs reviewed, Medications reviewed and Radiology images reviewed  Duarte catheter::  No Duarte  DVT prophylaxis  pharmacological::  Enoxaparin (Lovenox)      Physical Exam       Vitals:    09/06/18 2000 09/07/18 0416 09/07/18 0700 09/07/18 1600   BP: 123/72 121/60 115/69 105/59   Pulse: 62 (!) 57 73 69   Resp: 18 18 15 17   Temp: 36.7 °C (98.1 °F) 36.7 °C (98 °F) 36.1 °C (97 °F) 36.6 °C (97.9 °F)   SpO2: 99% 96% 98% 95%   Weight:       Height:         Body mass index is 35.18 kg/m².    Oxygen Therapy:  Pulse Oximetry: 95 % (240), O2 (LPM): 0, O2 Delivery: None (Room Air)    Physical Exam   Constitutional: She is oriented to person, place, and time. No distress.   Sitting up, smiling. In good spirits   HENT:   Head: Normocephalic and atraumatic.   Mouth/Throat: Oropharynx is clear and moist. No oropharyngeal exudate.   Poor oral hygiene   Eyes: Conjunctivae and EOM are normal. No scleral icterus.   Neck: Normal range of motion.   Cardiovascular: Normal rate, regular rhythm and intact distal pulses.    Murmur (II/IV holosystolic murmur heard best at left sternal border) heard.  Pulmonary/Chest: Effort normal and breath sounds normal. No respiratory distress. She has no wheezes.   Musculoskeletal: She exhibits no edema.   Right second toe wrapped in bandage   Neurological: She is alert and oriented to person, place, and time.   Moving all extremities spontaneously   Skin: She is not diaphoretic.   Multiple excoriations on lower extremities   Psychiatric: Affect normal. She is not agitated.       Assessment/Plan     * Acute osteomyelitis of toe of right foot (HCC)   Assessment & Plan    - Osteomyelitis of second digit of right foot superimposed by cellulitis s/p amputation 9/4/18- complicated with diagnosis of new onset of Diabetes  - Wound cx: Methicillin sensitive Staphylococcus Aureus  - Blood cx: No growth  - continue Unasyn and discontinue on 9/71/18 (last dose today)  - OR cultures from second metatarsal joint have been negative so far  - Limb Preservation service following  - Plan for discharge tomorrow morning           "  Uncontrolled diabetes mellitus (HCC)   Assessment & Plan    - Newly diagnosed with no known prior history of DM  - HbA1C 13.2 on admission   - Elevated blood sugars secondary to stress response from surgery 9/4/18  -  assisting to get insulin and other supplies for patient. Should be ready by tomorrow morning.   - Continue lisinopril 5 mg  - Plan for discharge tomorrow.   - Will need to follow-up with a primary care provider post-dischrage        Substance abuse   Assessment & Plan    - methamphetamine (\"speed\") last used on 9/2/18  - UDS + for amphetamines   - Drug abuse counseling   - hx of IVDU  - negative screening for Hepatitis C and HIV        Hypomagnesemia   Assessment & Plan    - Replete as needed  - continue to monitor        Hypokalemia   Assessment & Plan    - Oral Repletion as needed   - Continue to monitor          "

## 2023-01-01 NOTE — PROGRESS NOTES
Transport took pt in bed off unit to go down to PreOp.  RN and pt talked to her mom on the phone.  Mom is on her way and wanted to be here before surgery to see pt, but surgery schedule changed and pt is being take to surgery now instead of at 2pm.  Mom would still like to talk to Pre-Op RN/Dr. Stevens before pt goes to surgery.   Mom's number is [phone]    Pt placed in hospital gown with hospital underwear.  IV locked off.   Aviva Cox R.N.     I will START or STAY ON the medications listed below when I get home from the hospital:  None

## (undated) DEVICE — NEPTUNE 4 PORT MANIFOLD - (20/PK)

## (undated) DEVICE — PACK LOWER EXTREMITY - (2/CA)

## (undated) DEVICE — GOWN WARMING STANDARD FLEX - (30/CA)

## (undated) DEVICE — SUTURE 2-0 ETHILON FS - (36/BX) 18 INCH

## (undated) DEVICE — SUCTION INSTRUMENT YANKAUER BULBOUS TIP W/O VENT (50EA/CA)

## (undated) DEVICE — SET LEADWIRE 5 LEAD BEDSIDE DISPOSABLE ECG (1SET OF 5/EA)

## (undated) DEVICE — GLOVE BIOGEL SZ 6 PF LATEX - (50EA/BX 4BX/CA)

## (undated) DEVICE — GLOVE BIOGEL PI INDICATOR SZ 6.5 SURGICAL PF LF - (50/BX 4BX/CA)

## (undated) DEVICE — WATER IRRIG. STER. 1500 ML - (9/CA)

## (undated) DEVICE — SLEEVE, VASO, THIGH, MED

## (undated) DEVICE — MASK, LARYNGEAL AIRWAY #4

## (undated) DEVICE — GLOVE BIOGEL PI ORTHO SZ 7.5 PF LF (40PR/BX)

## (undated) DEVICE — KIT ROOM DECONTAMINATION

## (undated) DEVICE — BLADE SURGICAL #15 - (50/BX 3BX/CA)

## (undated) DEVICE — GLOVE BIOGEL PI INDICATOR SZ 7.0 SURGICAL PF LF - (50/BX 4BX/CA)

## (undated) DEVICE — SODIUM CHL IRRIGATION 0.9% 1000ML (12EA/CA)

## (undated) DEVICE — HEAD HOLDER JUNIOR/ADULT

## (undated) DEVICE — ELECTRODE DUAL RETURN W/ CORD - (50/PK)

## (undated) DEVICE — TUBING CLEARLINK DUO-VENT - C-FLO (48EA/CA)

## (undated) DEVICE — BANDAGE STERILE 3 IN X 75 IN (12EA/BX 8BX/CA)

## (undated) DEVICE — CANISTER SUCTION 3000ML MECHANICAL FILTER AUTO SHUTOFF MEDI-VAC NONSTERILE LF DISP  (40EA/CA)

## (undated) DEVICE — LACTATED RINGERS INJ 1000 ML - (14EA/CA 60CA/PF)

## (undated) DEVICE — SUTURE GENERAL

## (undated) DEVICE — PAD LAP STERILE 18 X 18 - (5/PK 40PK/CA)

## (undated) DEVICE — SET EXTENSION WITH 2 PORTS (48EA/CA) ***PART #2C8610 IS A SUBSTITUTE*****

## (undated) DEVICE — CHLORAPREP 26 ML APPLICATOR - ORANGE TINT(25/CA)

## (undated) DEVICE — KIT ANESTHESIA W/CIRCUIT & 3/LT BAG W/FILTER (20EA/CA)

## (undated) DEVICE — SUTURE 3-0 VICRYL PLUS SH - 8X 18 INCH (12/BX)

## (undated) DEVICE — MASK ANESTHESIA ADULT  - (100/CA)

## (undated) DEVICE — GLOVE BIOGEL SZ 8 SURGICAL PF LTX - (50PR/BX 4BX/CA)

## (undated) DEVICE — GLOVE BIOGEL INDICATOR SZ 8.5 SURGICAL PF LTX - (50/BX 4BX/CA)

## (undated) DEVICE — SENSOR SPO2 NEO LNCS ADHESIVE (20/BX) SEE USER NOTES

## (undated) DEVICE — TRAY SKIN SCRUB PVP WET (20EA/CA) PART #DYND70356 DISCONTINUED

## (undated) DEVICE — ELECTRODE 850 FOAM ADHESIVE - HYDROGEL RADIOTRNSPRNT (50/PK)

## (undated) DEVICE — SET IRRIGATION CYSTOSCOPY TUBE L80 IN (20EA/CA)

## (undated) DEVICE — SUTURE 3-0 ETHILON FS-1 - (36/BX) 30 INCH

## (undated) DEVICE — GLOVE BIOGEL SZ 6.5 SURGICAL PF LTX (50PR/BX 4BX/CA)

## (undated) DEVICE — PROTECTOR ULNA NERVE - (36PR/CA)